# Patient Record
Sex: MALE | Race: WHITE | Employment: FULL TIME | URBAN - METROPOLITAN AREA
[De-identification: names, ages, dates, MRNs, and addresses within clinical notes are randomized per-mention and may not be internally consistent; named-entity substitution may affect disease eponyms.]

---

## 2017-02-21 ENCOUNTER — HOSPITAL ENCOUNTER (OUTPATIENT)
Dept: CT IMAGING | Age: 37
Discharge: HOME | End: 2017-02-21
Payer: SELF-PAY

## 2017-02-21 DIAGNOSIS — Z82.49 FAMILY HISTORY OF HEART DISEASE: ICD-10-CM

## 2017-02-21 DIAGNOSIS — Z86.79 HISTORY OF HIGH BLOOD PRESSURE: ICD-10-CM

## 2017-02-21 DIAGNOSIS — Z91.89 SEDENTARY LIFESTYLE: ICD-10-CM

## 2017-02-21 PROCEDURE — 75571 CT HRT W/O DYE W/CA TEST: CPT

## 2017-02-22 NOTE — CARDIO/PULMONARY
Reached patient at his given mobile number and shared his coronary artery CT score of zero with him. Discussed the meaning of this score. Patient has no further questions at this time.

## 2021-03-04 ENCOUNTER — TELEPHONE (OUTPATIENT)
Dept: FAMILY MEDICINE CLINIC | Age: 41
End: 2021-03-04

## 2021-03-04 NOTE — TELEPHONE ENCOUNTER
Spoke to pt and reviewed old chart. Looks like pt was having BP issues dated back to 12/8/2015. Pt advised of this. He stated that he needed it for his new insurance.

## 2023-01-10 ENCOUNTER — HOSPITAL ENCOUNTER (OUTPATIENT)
Dept: PHYSICAL THERAPY | Age: 43
Discharge: HOME | End: 2023-01-10
Payer: COMMERCIAL

## 2023-01-10 PROCEDURE — 97110 THERAPEUTIC EXERCISES: CPT

## 2023-01-10 PROCEDURE — 97162 PT EVAL MOD COMPLEX 30 MIN: CPT

## 2023-01-10 NOTE — THERAPY EVALUATION
[address] Physical Therapy  [address] [facility] [address] [address] [place]  Phone: [phone] Fax: [phone]    Plan of Care/Statement of Necessity for Physical Therapy Services  2-15    Patient name: Vickey Early  : 1980  Provider#: [id number]  Referral source: Emir Duvall PA-C      Medical/Treatment Diagnosis: Right arm pain [M79.601]  Right shoulder pain [M25.511]     Prior Hospitalization: see medical history     Comorbidities: allergies, asthma, back pain, headaches, high blood pressure  Prior Level of Function: independent  Medications: Verified on Patient Summary List    Start of Care: 01/10/2023      Onset Date: subacute onset       The Plan of Care and following information is based on the information from the initial evaluation. Assessment/ key information: Patient is a 38year old male who presents to physical therapy services due to subacute onset of right lateral shoulder pain. Patient presents today with functional mobility, muscle endurance, muscle power, and movement impairments. Patient demonstrated decreased upper extremity/periscapular strength during MMT assessment, limited pain-free shoulder range of motion, tenderness to palpation along anterior [address]  joint with manual intervention, hypomobile glenohumeral joint/posterior capsule tightness, and pain limiting daily functional tasks. Patient pain responded favorable to manual intervention and strengthening exercises in todays session, with report of diminished symptoms post-treatment. Patient would benefit from continued skilled physical therapy services to address the impairments listed above to allow for full participation in daily functional tasks such as reaching/lifting/OH activities with minimal to no pain.       Right Shoulder ROM:           AROM                                                 PROM              Flexion                         180 deg, p! with overpressure            180 deg              Abduction                    180 deg, p! with overpressure            180 deg                                                 IR                                 Hand to ~T12-L1, p! NT, P! ER                               Hand to T5                                           61 degrees, p! Left shoulder ROM:         AROM                  PROM  Internal rotation:           Hand to T7                 NT  External rotation:          Hand to T7                       82 degrees    Evaluation Complexity History HIGH Complexity :3+ comorbidities / personal factors will impact the outcome/ POC ; Examination MEDIUM Complexity : 3 Standardized tests and measures addressing body structure, function, activity limitation and / or participation in recreation  ;Presentation MEDIUM Complexity : Evolving with changing characteristics  ; Clinical Decision Making MEDIUM Complexity : FOTO score of 26-74  Overall Complexity Rating: MEDIUM    Problem List: pain affecting function, decrease ROM, decrease strength, decrease ADL/ functional abilitiies, decrease activity tolerance, and decrease flexibility/ joint mobility   Treatment Plan may include any combination of the following: Therapeutic exercise, Neuromuscular reeducation, Manual therapy, Therapeutic activity, Self care/home management, Electric stim unattended , Vasopneumatic device, Gait training, Ultrasound, Mechanical traction, Electric stim attended, Needle insertion w/o injection (1 or 2 muscles), and Needle insertion w/o injection (3+ muscles)  Patient / Family readiness to learn indicated by: asking questions, trying to perform skills, and interest  Persons(s) to be included in education: patient (P)  Barriers to Learning/Limitations: None  Patient Goal (s): to not hurt, get rid of pain  Patient Self Reported Health Status: good  Rehabilitation Potential: good    Short Term Goals: To be accomplished in 6-8 treatments:   Patient will demonstrate understanding of and compliance with HEP showing full participation in physical therapy. Patient will demonstrate right shoulder ER and IR A/PROM >/= 70 degrees with minimal to no pain showing improving functional mobility. Patient will report reduced familiar symptoms by >/= 25% allowing for improving participation in daily tasks. Patient will demonstrate understanding/application of postural principles/recommendations to daily activities toward improved symptom management. Long Term Goals: To be accomplished in 16-18 treatments:   Patient will report minimal to no pain during reaching/lifting activities to show improving functional mobility and participation in daily tasks. Patient will improve FOTO score by the [facility] of 5 to >/= 70 showing significant improvement in their self-reported level of function. Patient will report reduced familiar symptoms by >/=80% while performing ADLs compared to initial evaluation allowing full participation in daily tasks with minimal to no pain. Frequency / Duration: Patient to be seen 2 times per week for 18 treatments. Patient/ Caregiver education and instruction: self care, activity modification, and exercises    [x]  Plan of care has been reviewed with ÁNGEL Block, PT 1/10/2023     ________________________________________________________________________    I certify that the above Therapy Services are being furnished while the patient is under my care. I agree with the treatment plan and certify that this therapy is necessary.     Physician's Signature:____________________  Date:____________Time: _________      Sharyle Rm, PA-C

## 2023-01-10 NOTE — PROGRESS NOTES
PT INITIAL EVALUATION NOTE - [facility] 2-15    Patient Name: Yvon Prudent  Date:1/10/2023  : 1980  [x]  Patient  Verified  Payor: Yanci Mix / Plan: ERICNIRAJ LOUISE [facility] [address] / Product Type: PPO /    In time: 8:10 AM  Out time: 9:15 AM  Total Treatment Time (min): 65 minutes  Total Timed Codes (min): 23 minutes  1:1 Treatment Time ( only): 55 minutes   Visit #: 1     Treatment Area: Right arm pain [M79.601]  Right shoulder pain [M25.511]    SUBJECTIVE  Pain Level (0-10 scale): 3/10 lateral aspect of R shoulder  Any medication changes, allergies to medications, adverse drug reactions, diagnosis change, or new procedure performed?: [] No    [x] Yes (see summary sheet for update)  Subjective:  Patient is R hand dominant. Patient reports random onset of right arm pain that began ~2 months ago. Patient reports pain is located along lateral aspect of upper arm, reports minimal to no pain along GH joint. Pain described as burning sensation and sometimes an ache/soreness at rest. Sometimes he is able to perform all ADLs without any pain, but then sometimes experiences severe pain. Patient notes any motion/movement of R arm away from his body is the worst pain. Difficulty reaching/lifting up and/or away from his body. Reports no hx of previous neck or shoulder pain. No numbness or tingling. Patient was hoping pain would go away on its own, but did not. Pt saw PCP ~3-4 weeks ago and was told it may be something with a muscle/tendon. Patient has had no imaging done at this point. Patient reports he has not noticed any change in strength or range of motion. Reports he has been trying to not move it as much since it has began hurting, specifically has been keeping his arm close to his side. No difficulty sleeping. Current functional limitations: reaching, overhead activities, lifting. Patient goals are \"to not hurt, get rid of pain. \"    PLOF: independent   Mechanism of Injury: subacute onset  Previous Treatment/Compliance: n/a  PMHx/Surgical Hx: see chart  Work Hx: IT  - [employer]  Living Situation: with wife and kids  Pt Goals: \"to not hurt, get rid of pain\"  Barriers: none  Motivation: good      OBJECTIVE/EXAMINATION  OBJECTIVE  Posture:  forward flex head posture with dec cervical lordosis , bilateral scapula protraction  Other Observations:  right shoulder depressed > left  Functional  and Pinch:  no change, WNL  Palpation: slight TTP noted along anterior GH joint during manual intervention     Cervical AROM: ALL WFL, no pain      Right Shoulder ROM:           AROM                                                 PROM              Flexion                         180 deg, p! with overpressure            180 deg              Abduction                    180 deg, p! with overpressure            180 deg                                                 IR                                 Hand to ~T12-L1, p! NT, P! ER                               Hand to T5                                           61 degrees, p!      Left shoulder ROM:         AROM                  PROM  Internal rotation:           Hand to T7                 NT  External rotation:          Hand to T7                       82 degrees      Joint Mobility Assessment: Glenohumeral: hypomobile          Flexibility: dec muscle length bilateral UT/Lev scap/SCM/pectoralis major/minor    UPPER QUARTER   MUSCLE STRENGTH  KEY       R  L  0 - No Contraction   Flexion  4+/5  5/5  1 - Trace    Extension nt  nt  2 - Poor    Abduction 4+/5  5/5  3 - Fair     IR  4+/5, P! 4+/5  4 - Good    ER  4+/5, P! 4+/5  5 - Normal        Special Tests: Right Upper extremity:   Spurling's Test: (-)   Painful Arc: (-)   Full Can Test: (-)   ER Lag: (-)   Lift Off test: (+)   Betty: (+)  Héctor Impingement: (+)  Apprehension-Relocation: (+)         Modality rationale: decrease inflammation, decrease pain, and increase tissue extensibility to improve the patients ability to perform functional daily tasks with minimal to no pain. Min Type Additional Details    [] Estim: []Att   []Unatt        []TENS instruct                  []IFC  []Premod   []NMES                     []Other:  []w/US   []w/ice   []w/heat  Position:  Location:    []  Traction: [] Cervical       []Lumbar                       [] Prone          []Supine                       []Intermittent   []Continuous Lbs:  [] before manual  [] after manual  []w/heat    []  Ultrasound: []Continuous   [] Pulsed at:                           []1MHz   []3MHz Location:  W/cm2:    [] Paraffin         Location:   []w/heat   nt [x]  Ice     []  Heat  []  Ice massage Position: seated  Location: R shoulder     []  Laser  []  Other: Position:  Location:      []  Vasopneumatic Device Pressure:       [] lo [] med [] hi   Temperature:      [x] Skin assessment post-treatment:  [x]intact []redness- no adverse reaction    []redness - adverse reaction:     23 min Therapeutic Exercise:  [x] See flow sheet :   Rationale: increase ROM and increase strength to improve the patients ability to perform functional daily tasks with minimal to no pain.            With   [x] TE   [] TA   [] Neuro   [] SC   [] other: Patient Education: [x] Review HEP    [] Progressed/Changed HEP based on:   [] positioning   [] body mechanics   [] transfers   [] heat/ice application    [] other:      Other Objective/Functional Measures: FOTO Functional Measure: 65/100                Pain Level (0-10 scale) post treatment: 1.5 - 2 /10  lateral aspect of R shoulder    ASSESSMENT/Changes in Function:     [x]  See Plan of [address], PT 1/10/2023

## 2023-01-12 ENCOUNTER — HOSPITAL ENCOUNTER (OUTPATIENT)
Dept: PHYSICAL THERAPY | Age: 43
Discharge: HOME | End: 2023-01-12
Payer: COMMERCIAL

## 2023-01-12 PROCEDURE — 97110 THERAPEUTIC EXERCISES: CPT

## 2023-01-12 PROCEDURE — 97016 VASOPNEUMATIC DEVICE THERAPY: CPT

## 2023-01-12 PROCEDURE — 97140 MANUAL THERAPY 1/> REGIONS: CPT

## 2023-01-12 NOTE — PROGRESS NOTES
PT DAILY TREATMENT NOTE - [facility] 2-15    Patient Name: Annette Chávez  Date:2023  : 1980  [x]  Patient  Verified  Payor: Tate Hawk / [facility] [address] / Product Type: PPO /    In time: 9:31 AM  Out time: 10:30 AM  Total Treatment Time (min): 59 minutes  Total Timed Codes (min): 49 minutes  1:1 Treatment Time ( only): 40 minutes  Visit #:  2    Treatment Area: Right arm pain [M79.601]  Right shoulder pain [M25.511]    SUBJECTIVE  Pain Level (0-10 scale): 1-2/10  Any medication changes, allergies to medications, adverse drug reactions, diagnosis change, or new procedure performed?: [x] No    [] Yes (see summary sheet for update)  Subjective functional status/changes:   [] No changes reported    Patient reports compliance with HEP. Reports some increase in muscle soreness following last session, but notes pain returned to baseline quickly. OBJECTIVE    Modality rationale: decrease inflammation, decrease pain, and increase tissue extensibility to improve the patients ability to perform functional daily tasks with minimal to no pain.     Min Type Additional Details       [] Estim: []Att   []Unatt    []TENS instruct                  []IFC  []Premod   []NMES                     []Other:  []w/US   []w/ice   []w/heat  Position:  Location:       []  Traction: [] Cervical       []Lumbar                       [] Prone          []Supine                       []Intermittent   []Continuous Lbs:  [] before manual  [] after manual  []w/heat    []  Ultrasound: []Continuous   [] Pulsed                       at: []1MHz   []3MHz Location:  W/cm2:    [] Paraffin         Location:   []w/heat    []  Ice     []  Heat  []  Ice massage Position:  Location:    []  Laser  []  Other: Position:  Location:   nt   [x]  Vasopneumatic Device Pressure:       [] lo [x] med [] hi   Temperature: 34 deg     [x] Skin assessment post-treatment:  [x]intact []redness- no adverse reaction    []redness - adverse reaction:     39 min Therapeutic Exercise:  [] See flow sheet :   Rationale: increase ROM and increase strength to improve the patients ability to perform functional daily tasks with minimal to no pain. 10 min Manual Therapy: supine: R shoulder PROM with oscillations and manual distraction all directions; AP GH joint mobilizations    Rationale: decrease pain, increase ROM, and increase tissue extensibility to improve the patients ability to perform functional daily tasks with minimal to no pain. With   [x] TE   [] TA   [] Neuro   [] SC   [] other: Patient Education: [x] Review HEP    [] Progressed/Changed HEP based on:   [] positioning   [] body mechanics   [] transfers   [] heat/ice application    [] other:      Other Objective/Functional Measures:     Pain Level (0-10 scale) post treatment: 0.5/10    ASSESSMENT/Changes in Function:   Patient tolerates treatment session well today. Reviewed initial HEP prescribed at initial evaluation with patient, providing verbal and tactile cues throughout for proper technique. Patient exhibits limited right shoulder active/passive external rotation, however responds favorably to manual intervention and joint mobilizations. Patient demonstrates increased passive ER ROM and dec report of pain on test-retest following joint mobs. Progressed shoulder stability/strengthening exercises with sidelying shoulder series. Patient tolerates light resistance performing active shoulder ER and shoulder flexion, however was unable to tolerate resistance performing shoulder abduction due to deltoid and supraspinatus weakness. Patient will continue to benefit from skilled PT services to modify and progress therapeutic interventions, address functional mobility deficits, address ROM deficits, address strength deficits, analyze and cue movement patterns, and analyze and modify body mechanics/ergonomics to attain remaining goals.      [x]  See Plan of Care  []  See progress note/recertification  []  See Discharge Summary         Progress towards goals / Updated goals:  Short Term Goals: To be accomplished in 6-8 treatments:               Patient will demonstrate understanding of and compliance with HEP showing full participation in physical therapy. Patient will demonstrate right shoulder ER and IR A/PROM >/= 70 degrees with minimal to no pain showing improving functional mobility. Patient will report reduced familiar symptoms by >/= 25% allowing for improving participation in daily tasks. Patient will demonstrate understanding/application of postural principles/recommendations to daily activities toward improved symptom management. Long Term Goals: To be accomplished in 16-18 treatments:               Patient will report minimal to no pain during reaching/lifting activities to show improving functional mobility and participation in daily tasks. Patient will improve FOTO score by the [facility] [address] of 5 to >/= 70 showing significant improvement in their self-reported level of function. Patient will report reduced familiar symptoms by >/=80% while performing ADLs compared to initial evaluation allowing full participation in daily tasks with minimal to no pain.     PLAN  [x]  Upgrade activities as tolerated     [x]  Continue plan of care  [x]  Update interventions per flow sheet       []  Discharge due to:_  []  Other:_      [place], PT 1/12/2023

## 2023-01-17 ENCOUNTER — HOSPITAL ENCOUNTER (OUTPATIENT)
Dept: PHYSICAL THERAPY | Age: 43
Discharge: HOME | End: 2023-01-17
Payer: COMMERCIAL

## 2023-01-17 PROCEDURE — 97110 THERAPEUTIC EXERCISES: CPT

## 2023-01-17 PROCEDURE — 97016 VASOPNEUMATIC DEVICE THERAPY: CPT

## 2023-01-17 NOTE — PROGRESS NOTES
PT DAILY TREATMENT NOTE - [facility] 2-15    Patient Name: Mack Mustafa  Date:2023  : 1980  [x]  Patient  Verified  Payor: Arnol Mckeon / Plan: [employer] DANI [address] / Product Type: PPO /    In time: 10:35 AM  Out time: 11:32 AM  Total Treatment Time (min): 57 minutes  Total Timed Codes (min): 47 minutes  1:1 Treatment Time ( only): 32 minutes  Visit #:  3    Treatment Area: Right arm pain [M79.601]  Right shoulder pain [M25.511]    SUBJECTIVE  Pain Level (0-10 scale): 1-210  Any medication changes, allergies to medications, adverse drug reactions, diagnosis change, or new procedure performed?: [x] No    [] Yes (see summary sheet for update)  Subjective functional status/changes:   [] No changes reported    Patient reports he has noted his pain has decrease in frequency and severity of pain level. Reports he has been trying to increase use of UE (reaching and lifting). OBJECTIVE    Modality rationale: decrease inflammation, decrease pain, and increase tissue extensibility to improve the patients ability to perform functional daily tasks with minimal to no pain.     Min Type Additional Details       [] Estim: []Att   []Unatt    []TENS instruct                  []IFC  []Premod   []NMES                     []Other:  []w/US   []w/ice   []w/heat  Position:  Location:       []  Traction: [] Cervical       []Lumbar                       [] Prone          []Supine                       []Intermittent   []Continuous Lbs:  [] before manual  [] after manual  []w/heat    []  Ultrasound: []Continuous   [] Pulsed                       at: []1MHz   []3MHz Location:  W/cm2:    [] Paraffin         Location:   []w/heat    []  Ice     []  Heat  []  Ice massage Position:  Location:    []  Laser  []  Other: Position:  Location:   10   [x]  Vasopneumatic Device Pressure:       [] lo [x] med [] hi   Temperature: 34 deg     [x] Skin assessment post-treatment:  [x]intact []redness- no adverse reaction []redness - adverse reaction:     45 min Therapeutic Exercise:  [] See flow sheet :   Rationale: increase ROM and increase strength to improve the patients ability to perform functional daily tasks with minimal to no pain. N/a min Manual Therapy: supine: R shoulder PROM with oscillations and manual distraction all directions; AP GH joint mobilizations    Rationale: decrease pain, increase ROM, and increase tissue extensibility to improve the patients ability to perform functional daily tasks with minimal to no pain. With   [x] TE   [] TA   [] Neuro   [] SC   [] other: Patient Education: [x] Review HEP    [] Progressed/Changed HEP based on:   [] positioning   [] body mechanics   [] transfers   [] heat/ice application    [] other:      Other Objective/Functional Measures:     Pain Level (0-10 scale) post treatment: 3/10 \"muscle soreness\"    ASSESSMENT/Changes in Function:   Patient tolerates treatment session well today. Patient demonstrates improving periscapular/UE strength, tolerating progression of exercises via increase resistance. Patient had difficulty with proper technique of supine SA punches due to poor scapular muscular control and scapular rhythm. Provided tactile cues to assist with scapular protraction/retraction and noted slight improvement. Patient report of increase in muscle fatigue and muscle soreness post-tx, but notes no increase in familiar symptoms. Patient will continue to benefit from skilled PT services to modify and progress therapeutic interventions, address functional mobility deficits, address ROM deficits, address strength deficits, analyze and cue movement patterns, and analyze and modify body mechanics/ergonomics to attain remaining goals. [x]  See Plan of Care  []  See progress note/recertification  []  See Discharge Summary         Progress towards goals / Updated goals:  Short Term Goals:  To be accomplished in 6-8 treatments:               Patient will demonstrate understanding of and compliance with HEP showing full participation in physical therapy. Patient will demonstrate right shoulder ER and IR A/PROM >/= 70 degrees with minimal to no pain showing improving functional mobility. Patient will report reduced familiar symptoms by >/= 25% allowing for improving participation in daily tasks. Patient will demonstrate understanding/application of postural principles/recommendations to daily activities toward improved symptom management. Long Term Goals: To be accomplished in 16-18 treatments:               Patient will report minimal to no pain during reaching/lifting activities to show improving functional mobility and participation in daily tasks. Patient will improve FOTO score by the [facility] [address] of 5 to >/= 70 showing significant improvement in their self-reported level of function. Patient will report reduced familiar symptoms by >/=80% while performing ADLs compared to initial evaluation allowing full participation in daily tasks with minimal to no pain.     PLAN  [x]  Upgrade activities as tolerated     [x]  Continue plan of care  [x]  Update interventions per flow sheet       []  Discharge due to:_  []  Other:_      Javid, PT 1/17/2023

## 2023-01-19 ENCOUNTER — HOSPITAL ENCOUNTER (OUTPATIENT)
Dept: PHYSICAL THERAPY | Age: 43
Discharge: HOME | End: 2023-01-19
Payer: COMMERCIAL

## 2023-01-19 PROCEDURE — 97110 THERAPEUTIC EXERCISES: CPT

## 2023-01-19 PROCEDURE — 97016 VASOPNEUMATIC DEVICE THERAPY: CPT

## 2023-01-19 PROCEDURE — 97140 MANUAL THERAPY 1/> REGIONS: CPT

## 2023-01-19 NOTE — PROGRESS NOTES
PT DAILY TREATMENT NOTE - [facility] 2-15    Patient Name: Samson Other  Date:2023  : 1980  [x]  Patient  Verified  Payor: Diana Barahona / Plan: [employer] [facility] [address] / Product Type: PPO /    In time: 947  Out time: 804  Total Treatment Time (min): 60 minutes  Total Timed Codes (min): 50 minutes  1:1 Treatment Time ( only): 42 minutes  Visit #:  4    Treatment Area: Right arm pain [M79.601]  Right shoulder pain [M25.511]    SUBJECTIVE  Pain Level (0-10 scale): 1-2/10  Any medication changes, allergies to medications, adverse drug reactions, diagnosis change, or new procedure performed?: [x] No    [] Yes (see summary sheet for update)  Subjective functional status/changes:   [] No changes reported    Patient noted they have been doing pretty well since previous treatment session, noted they had a bit more fatigue and soreness following. Notes they have continued trying to use their right arm for more activities since initial treatment. OBJECTIVE    Modality rationale: decrease inflammation, decrease pain, and increase tissue extensibility to improve the patients ability to perform functional daily tasks with minimal to no pain.     Min Type Additional Details       [] Estim: []Att   []Unatt    []TENS instruct                  []IFC  []Premod   []NMES                     []Other:  []w/US   []w/ice   []w/heat  Position:  Location:       []  Traction: [] Cervical       []Lumbar                       [] Prone          []Supine                       []Intermittent   []Continuous Lbs:  [] before manual  [] after manual  []w/heat    []  Ultrasound: []Continuous   [] Pulsed                       at: []1MHz   []3MHz Location:  W/cm2:    [] Paraffin         Location:   []w/heat    []  Ice     []  Heat  []  Ice massage Position:  Location:    []  Laser  []  Other: Position:  Location:   10   [x]  Vasopneumatic Device Pressure:       [] lo [x] med [] hi   Temperature: 34 deg     [x] Skin assessment post-treatment:  [x]intact []redness- no adverse reaction    []redness - adverse reaction:     40 min Therapeutic Exercise:  [] See flow sheet :   Rationale: increase ROM and increase strength to improve the patients ability to perform functional daily tasks with minimal to no pain. 10 min Manual Therapy: supine: R shoulder PROM with oscillations and manual distraction all directions; AP GH joint mobilizations    Rationale: decrease pain, increase ROM, and increase tissue extensibility to improve the patients ability to perform functional daily tasks with minimal to no pain. With   [x] TE   [] TA   [] Neuro   [] SC   [] other: Patient Education: [x] Review HEP    [] Progressed/Changed HEP based on:   [] positioning   [] body mechanics   [] transfers   [] heat/ice application    [] other:      Other Objective/Functional Measures:     Pain Level (0-10 scale) post treatment: \"frozen, but not worse,\"    ASSESSMENT/Changes in Function:   Patient tolerated treatment session well today, able to perform exercises and progressions without increasing pain symptoms post treatment session. Patient continues to note increased pain with overhead \"Y\" stretch on 1/2 foam roll, and needed multiple verbal cues to reduce ROM to avoid pain. Patient will continue to benefit from skilled PT services to modify and progress therapeutic interventions, address functional mobility deficits, address ROM deficits, address strength deficits, analyze and cue movement patterns, and analyze and modify body mechanics/ergonomics to attain remaining goals. [x]  See Plan of Care  []  See progress note/recertification  []  See Discharge Summary         Progress towards goals / Updated goals:  Short Term Goals: To be accomplished in 6-8 treatments:               Patient will demonstrate understanding of and compliance with HEP showing full participation in physical therapy.   Patient will demonstrate right shoulder ER and IR A/PROM >/= 70 degrees with minimal to no pain showing improving functional mobility. Patient will report reduced familiar symptoms by >/= 25% allowing for improving participation in daily tasks. Patient will demonstrate understanding/application of postural principles/recommendations to daily activities toward improved symptom management. Long Term Goals: To be accomplished in 16-18 treatments:               Patient will report minimal to no pain during reaching/lifting activities to show improving functional mobility and participation in daily tasks. Patient will improve FOTO score by the [facility] [address] of 5 to >/= 70 showing significant improvement in their self-reported level of function. Patient will report reduced familiar symptoms by >/=80% while performing ADLs compared to initial evaluation allowing full participation in daily tasks with minimal to no pain.     PLAN  [x]  Upgrade activities as tolerated     [x]  Continue plan of care  [x]  Update interventions per flow sheet       []  Discharge due to:_  []  Other:_      Dayna uNnes, PTA 1/19/2023

## 2023-01-24 ENCOUNTER — HOSPITAL ENCOUNTER (OUTPATIENT)
Dept: PHYSICAL THERAPY | Age: 43
Discharge: HOME | End: 2023-01-24
Payer: COMMERCIAL

## 2023-01-24 PROCEDURE — 97140 MANUAL THERAPY 1/> REGIONS: CPT

## 2023-01-24 PROCEDURE — 97016 VASOPNEUMATIC DEVICE THERAPY: CPT

## 2023-01-24 PROCEDURE — 97110 THERAPEUTIC EXERCISES: CPT

## 2023-01-24 NOTE — PROGRESS NOTES
PT DAILY TREATMENT NOTE - [facility] 2-15    Patient Name: Dianna Cornelius  Date:2023  : 1980  [x]  Patient  Verified  Payor: Leisa Grewal / Plan: [facility] DANI [address] / Product Type: PPO /    In time:  8:33 AM Out time: 9:40 AM  Total Treatment Time (min): 67 minutes  Total Timed Codes (min):  52 minutes  1:1 Treatment Time ( only): 45 minutes  Visit #:  5    Treatment Area: Right arm pain [M79.601]  Right shoulder pain [M25.511]    SUBJECTIVE  Pain Level (0-10 scale):  1-210  Any medication changes, allergies to medications, adverse drug reactions, diagnosis change, or new procedure performed?: [x] No    [] Yes (see summary sheet for update)  Subjective functional status/changes:   [] No changes reported    Patient reports he experienced onset of muscle soreness following last session, but returned to baseline within the day or so. However, reports on Saturday/ he began to experience some increase in overall pain. Patient experienced his typical pain located on lateral shoulder, but also notes some onset of pain in anterior [facility] joint that is new. Patient reports he performed HEP yesterday with minimal difficulty while doing exercises, but experienced increase in pain the rest of the day. OBJECTIVE    Modality rationale: decrease inflammation, decrease pain, and increase tissue extensibility to improve the patients ability to perform functional daily tasks with minimal to no pain.     Min Type Additional Details       [] Estim: []Att   []Unatt    []TENS instruct                  []IFC  []Premod   []NMES                     []Other:  []w/US   []w/ice   []w/heat  Position:  Location:       []  Traction: [] Cervical       []Lumbar                       [] Prone          []Supine                       []Intermittent   []Continuous Lbs:  [] before manual  [] after manual  []w/heat    []  Ultrasound: []Continuous   [] Pulsed                       at: []1MHz   []3MHz Location:  W/cm2: [] Paraffin         Location:   []w/heat    []  Ice     []  Heat  []  Ice massage Position:  Location:    []  Laser  []  Other: Position:  Location:   15   [x]  Vasopneumatic Device Pressure:       [] lo [x] med [] hi   Temperature: 34 deg     [x] Skin assessment post-treatment:  [x]intact []redness- no adverse reaction    []redness - adverse reaction:      42 min Therapeutic Exercise:  [] See flow sheet :   Rationale: increase ROM and increase strength to improve the patients ability to perform functional daily tasks with minimal to no pain. 10 min Manual Therapy: supine: R shoulder PROM with oscillations and manual distraction all directions; AP GH joint mobilizations    Rationale: decrease pain, increase ROM, and increase tissue extensibility to improve the patients ability to perform functional daily tasks with minimal to no pain. With   [x] TE   [] TA   [] Neuro   [] SC   [] other: Patient Education: [x] Review HEP    [] Progressed/Changed HEP based on:   [] positioning   [] body mechanics   [] transfers   [] heat/ice application    [] other:      Other Objective/Functional Measures:     Pain Level (0-10 scale) post treatment:  1-2 in R shoulder    ASSESSMENT/Changes in Function:   Patient tolerated treatment session well today, able to perform exercises and progressions appropriately. Noted inc in overall familiar symptoms and pain throughout visit > previous visits. Patient notes new onset of anterior [address] joint pain, but chief complaint remains lateral shoulder pain. Modified intensity of overall session, focusing on gentle active range of motion and UE strengthening exercises. Patient notes discomfort at end range of motion shoulder flexion, abd,and ER, but reports improves with inc repetitions. Patient has tendency to keep shoulder adducted and internally rotated with 90 deg elbow flexion close to his side with shoulder muscle guarding.  Discussed importance of trying to keep UE muscles relaxed and moving to avoid muscle tightness/frozen shoulder Continue to progress per patient tolerance. Patient will continue to benefit from skilled PT services to modify and progress therapeutic interventions, address functional mobility deficits, address ROM deficits, address strength deficits, analyze and cue movement patterns, and analyze and modify body mechanics/ergonomics to attain remaining goals. [x]  See Plan of Care  []  See progress note/recertification  []  See Discharge Summary         Progress towards goals / Updated goals:  Short Term Goals: To be accomplished in 6-8 treatments:               Patient will demonstrate understanding of and compliance with HEP showing full participation in physical therapy. Patient will demonstrate right shoulder ER and IR A/PROM >/= 70 degrees with minimal to no pain showing improving functional mobility. Patient will report reduced familiar symptoms by >/= 25% allowing for improving participation in daily tasks. Patient will demonstrate understanding/application of postural principles/recommendations to daily activities toward improved symptom management. Long Term Goals: To be accomplished in 16-18 treatments:               Patient will report minimal to no pain during reaching/lifting activities to show improving functional mobility and participation in daily tasks. Patient will improve FOTO score by the [facility] [address] of 5 to >/= 70 showing significant improvement in their self-reported level of function. Patient will report reduced familiar symptoms by >/=80% while performing ADLs compared to initial evaluation allowing full participation in daily tasks with minimal to no pain.     PLAN  [x]  Upgrade activities as tolerated     [x]  Continue plan of care  [x]  Update interventions per flow sheet       []  Discharge due to:_  []  Other:_      [employer], PT 1/24/2023

## 2023-01-26 ENCOUNTER — HOSPITAL ENCOUNTER (OUTPATIENT)
Dept: PHYSICAL THERAPY | Age: 43
Discharge: HOME | End: 2023-01-26
Payer: COMMERCIAL

## 2023-01-26 PROCEDURE — 97110 THERAPEUTIC EXERCISES: CPT

## 2023-01-26 PROCEDURE — 97140 MANUAL THERAPY 1/> REGIONS: CPT

## 2023-02-21 ENCOUNTER — HOSPITAL ENCOUNTER (OUTPATIENT)
Dept: PHYSICAL THERAPY | Age: 43
Discharge: HOME | End: 2023-02-21
Payer: COMMERCIAL

## 2023-02-21 PROCEDURE — 97140 MANUAL THERAPY 1/> REGIONS: CPT

## 2023-02-21 PROCEDURE — 97016 VASOPNEUMATIC DEVICE THERAPY: CPT

## 2023-02-21 PROCEDURE — 97110 THERAPEUTIC EXERCISES: CPT

## 2023-02-21 NOTE — PROGRESS NOTES
[address] Physical Therapy  [address] [facility] [address] [facility] [address] [place]  Phone: [phone] Fax: [phone]    Progress Note    Name: No Cain   : 1980   MD: Fang Moya PA-C       Treatment Diagnosis: Right arm pain [M79.601]  Right shoulder pain [M25.511]  Start of Care: 1/10/2023    Visits from Start of Care: 7  Missed Visits: 0    Summary of Care: Skilled physical therapy has consisted of therapeutic exercise, manual intervention, and modalities focused on improving overall upper extremity/periscapular strength, postural awareness, pain-free range of motion, and pain modulation. Assessment / Recommendations:   Patient is a 38year old male who has been seen in skilled physical therapy for 7 visits since initial evaluation on 2023 due to right shoulder pain. Patient was making consistent progress towards all goals and pain was responding favorably to conservative treatment. Patient progress within past month has been slowed due to recent break in physical therapy visits. Patient has been seen for 2 visits over past month due to work conflicts. Patient notes pain continues to fluctuate, but has subjective report of 40-50% reduction in familiar symptoms since initial evaluation. Patient exhibits right shoulder active/passive range of motion similar to initial evaluation, but note decrease in left shoulder active abduction due to pain. Patient exhibits 90 degrees of active pain-free left shoulder abduction, but able to tolerate passive range of motion to 180 degrees. Discussed importance of continued compliance with HEP to maximize progress and benefit of skilled physical therapy. Patient pain responds favorably to manual intervention and therapeutic exercises this visit, with decrease in report of familiar symptoms and increase in pain free range of motion post-tx.  Patient will continue to benefit from skilled PT services to modify and progress therapeutic interventions, address functional mobility deficits, address ROM deficits, address strength deficits, analyze and cue movement patterns, and analyze and modify body mechanics/ergonomics to attain remaining goals. Other Objective/Functional Measures: Today vs (EVAL):  Right Shoulder ROM:           AROM                                                                         PROM              Flexion                        180 deg, p!  (180 deg, p! with overpressure)       180 deg   (180 deg)              Abduction                   90 deg, p! (180 deg, p! with overpressure)          180 deg (180 deg)                                               IR                               NT  (Hand to ~T12-L1, p!)                                  NT (NT, P!)              ER                               60 degrees (Hand to T5)                                    60 degrees (61 degrees, p!)     Left shoulder ROM:         AROM                  PROM  Internal rotation:           Hand to T7                 NT  External rotation:          Hand to T7                  82 degrees    Progress towards goals / Updated goals:  Short Term Goals: To be accomplished in 6-8 treatments:  Patient will demonstrate understanding of and compliance with HEP showing full participation in physical therapy. Progressing  Patient will demonstrate right shoulder ER and IR A/PROM >/= 70 degrees with minimal to no pain showing improving functional mobility. Progressing  Patient will report reduced familiar symptoms by >/= 25% allowing for improving participation in daily tasks. Met (subjective report: 40-50%)  Patient will demonstrate understanding/application of postural principles/recommendations to daily activities toward improved symptom management. Progressing     Long Term Goals:  To be accomplished in 16-18 treatments:  Patient will report minimal to no pain during reaching/lifting activities to show improving functional mobility and participation in daily tasks. Progressing  Patient will improve FOTO score by the [facility] [address] of 5 to >/= 70 showing significant improvement in their self-reported level of function. Progressing  Patient will report reduced familiar symptoms by >/=80% while performing ADLs compared to initial evaluation allowing full participation in daily tasks with minimal to no pain.  Progressing    Other: Continue per POC: 2x/week for 18 treatments       Javid, PT 2/21/2023

## 2023-02-21 NOTE — PROGRESS NOTES
PT DAILY TREATMENT NOTE - [facility] 2-15    Patient Name: Naresh Oscar  Date:2023  : 1980  [x]  Patient  Verified  Payor: Kenyatta Cochran / Plan: [employer] [facility]ANI [address] / Product Type: PPO /    In time:  7:30 AM Out time: 8:30 AM  Total Treatment Time (min): 60 minutes  Total Timed Codes (min):  50 minutes  1:1 Treatment Time ( only): 42  minutes  Visit #:  7    Treatment Area: Right arm pain [M79.601]  Right shoulder pain [M25.511]    SUBJECTIVE  Pain Level (0-10 scale):  1/10  Any medication changes, allergies to medications, adverse drug reactions, diagnosis change, or new procedure performed?: [x] No    [] Yes (see summary sheet for update)  Subjective functional status/changes:   [] No changes reported    Patient reports over the past 3 weeks without any physical therapy appointments, pain has been up and down. Patient notes overall it has been slightly better and tolerable, but has had a couple of days with intense pain. Patient notes he noted increase in shoulder pain last  () when reaching to put his arm around his son at [employer] that lasted the rest of the day. Since this onset of pain, he has not performed HEP at all. Reports he was busy with work which is why he was unable to come back in for appointment, but after noting increase in pain he made it a priority again. OBJECTIVE    Modality rationale: decrease inflammation, decrease pain, and increase tissue extensibility to improve the patients ability to perform functional daily tasks with minimal to no pain.     Min Type Additional Details       [] Estim: []Att   []Unatt    []TENS instruct                  []IFC  []Premod   []NMES                     []Other:  []w/US   []w/ice   []w/heat  Position:  Location:       []  Traction: [] Cervical       []Lumbar                       [] Prone          []Supine                       []Intermittent   []Continuous Lbs:  [] before manual  [] after manual  []w/heat    [] Ultrasound: []Continuous   [] Pulsed                       at: []1MHz   []3MHz Location:  W/cm2:    [] Paraffin         Location:   []w/heat    []  Ice     []  Heat  []  Ice massage Position:  Location:    []  Laser  []  Other: Position:  Location:   10   [x]  Vasopneumatic Device Pressure:       [] lo [x] med [] hi   Temperature: 34 deg     [x] Skin assessment post-treatment:  [x]intact []redness- no adverse reaction    []redness - adverse reaction:     35 min Therapeutic Exercise:  [] See flow sheet :   Rationale: increase ROM and increase strength to improve the patients ability to perform functional daily tasks with minimal to no pain. 15 min Manual Therapy: supine: R shoulder PROM with oscillations and manual distraction all directions; AP GH joint mobilizations; sidelying scapula MWM; subscapularis release; pectoralis release    Rationale: decrease pain, increase ROM, and increase tissue extensibility to improve the patients ability to perform functional daily tasks with minimal to no pain. With   [x] TE   [] TA   [] Neuro   [] SC   [] other: Patient Education: [x] Review HEP    [] Progressed/Changed HEP based on:   [] positioning   [] body mechanics   [] transfers   [] heat/ice application    [] other:      Other Objective/Functional Measures:    Today vs (EVAL):  Right Shoulder ROM:           AROM                                                   PROM              Flexion                        180 deg, p!  (180 deg, p! with overpressure)       180 deg   (180 deg)              Abduction                   90 deg, p! (180 deg, p! with overpressure)          180 deg (180 deg)                                               IR                               NT  (Hand to ~T12-L1, p!)                              NT (NT, P!)              ER                               60 degrees (Hand to T5)                                    60 degrees (61 degrees, p!)     Left shoulder ROM:         AROM PROM  Internal rotation:           Hand to T7                 NT  External rotation:          Hand to T7                  82 degrees    Pain Level (0-10 scale) post treatment:  2 in R shoulder    ASSESSMENT/Changes in Function:   Patient tolerated treatment session well today, able to perform exercises and progressions appropriately. Patient is a 38year old male who has been seen in skilled physical therapy for 7 visits since initial evaluation on 2/21/2023 due to right shoulder pain. Patient was making consistent progress towards all goals and pain was responding favorably to conservative treatment. Patient progress within past month has been slowed due to recent break in physical therapy visits. Patient has been seen for 2 visits over past month due to work conflicts. Patient notes pain continues to fluctuate, but has subjective report of 40-50% reduction in familiar symptoms since initial evaluation. Patient exhibits right shoulder active/passive range of motion similar to initial evaluation, but note decrease in left shoulder active abduction due to pain. Patient exhibits 90 degrees of active pain-free left shoulder abduction, but able to tolerate passive range of motion to 180 degrees. Discussed importance of continued compliance with HEP to maximize progress and benefit of skilled physical therapy. Patient pain responds favorably to manual intervention and therapeutic exercises this visit, with decrease in report of familiar symptoms and increase in pain free range of motion post-tx. Patient will continue to benefit from skilled PT services to modify and progress therapeutic interventions, address functional mobility deficits, address ROM deficits, address strength deficits, analyze and cue movement patterns, and analyze and modify body mechanics/ergonomics to attain remaining goals.      [x]  See Plan of Care  [x]  See progress note/recertification  []  See Discharge Summary         Progress towards goals / Updated goals:  Short Term Goals: To be accomplished in 6-8 treatments:  Patient will demonstrate understanding of and compliance with HEP showing full participation in physical therapy. Progressing  Patient will demonstrate right shoulder ER and IR A/PROM >/= 70 degrees with minimal to no pain showing improving functional mobility. Progressing  Patient will report reduced familiar symptoms by >/= 25% allowing for improving participation in daily tasks. Met (subjective report: 40-50%)  Patient will demonstrate understanding/application of postural principles/recommendations to daily activities toward improved symptom management. Progressing     Long Term Goals: To be accomplished in 16-18 treatments:  Patient will report minimal to no pain during reaching/lifting activities to show improving functional mobility and participation in daily tasks. Progressing  Patient will [facility] to >/= 70 showing significant improvement in their self-reported level of function. Progressing  Patient will report reduced familiar symptoms by >/=80% while performing ADLs compared to initial evaluation allowing full participation in daily tasks with minimal to no pain.  Progressing    PLAN  [x]  Upgrade activities as tolerated     [x]  Continue plan of care  [x]  Update interventions per flow sheet       []  Discharge due to:_  []  Other:_      Milana Barry, PT 2/21/2023

## 2023-02-23 ENCOUNTER — HOSPITAL ENCOUNTER (OUTPATIENT)
Dept: PHYSICAL THERAPY | Age: 43
Discharge: HOME | End: 2023-02-23
Payer: COMMERCIAL

## 2023-02-23 PROCEDURE — 97016 VASOPNEUMATIC DEVICE THERAPY: CPT

## 2023-02-23 PROCEDURE — 97140 MANUAL THERAPY 1/> REGIONS: CPT

## 2023-02-23 PROCEDURE — 97110 THERAPEUTIC EXERCISES: CPT

## 2023-02-23 NOTE — PROGRESS NOTES
PT DAILY TREATMENT NOTE - [facility] 2-15    Patient Name: Amanda Mccormack  Date:2023  : 1980  [x]  Patient  Verified  Payor: Sachin Saunders / Plan: [employer] DANI [facility] [address] / Product Type: PPO /    In time:  7:33 AM Out time: 8:32 AM  Total Treatment Time (min): 59 minutes  Total Timed Codes (min): 49 minutes  1:1 Treatment Time ( only): 49 minutes  Visit #:  8    Treatment Area: Right arm pain [M79.601]  Right shoulder pain [M25.511]    SUBJECTIVE  Pain Level (0-10 scale):  2-3/10  Any medication changes, allergies to medications, adverse drug reactions, diagnosis change, or new procedure performed?: [x] No    [] Yes (see summary sheet for update)  Subjective functional status/changes:   [] No changes reported    Patient reports noting increase in shoulder pain and soreness following last session, noting increase in pain at rest. Reports he was still able to perform all exercises in his HEP though. OBJECTIVE    Modality rationale: decrease inflammation, decrease pain, and increase tissue extensibility to improve the patients ability to perform functional daily tasks with minimal to no pain.     Min Type Additional Details       [] Estim: []Att   []Unatt    []TENS instruct                  []IFC  []Premod   []NMES                     []Other:  []w/US   []w/ice   []w/heat  Position:  Location:       []  Traction: [] Cervical       []Lumbar                       [] Prone          []Supine                       []Intermittent   []Continuous Lbs:  [] before manual  [] after manual  []w/heat    []  Ultrasound: []Continuous   [] Pulsed                       at: []1MHz   []3MHz Location:  W/cm2:    [] Paraffin         Location:   []w/heat    []  Ice     []  Heat  []  Ice massage Position:  Location:    []  Laser  []  Other: Position:  Location:   10   [x]  Vasopneumatic Device Pressure:       [] lo [x] med [] hi   Temperature: 34 deg     [x] Skin assessment post-treatment:  [x]intact []redness- no adverse reaction    []redness - adverse reaction:     34 min Therapeutic Exercise:  [] See flow sheet :   Rationale: increase ROM and increase strength to improve the patients ability to perform functional daily tasks with minimal to no pain. 15 min Manual Therapy: supine: R shoulder PROM with oscillations and manual distraction all directions; AP GH joint mobilizations; sidelying scapula MWM; subscapularis release; pectoralis release    Rationale: decrease pain, increase ROM, and increase tissue extensibility to improve the patients ability to perform functional daily tasks with minimal to no pain. With   [x] TE   [] TA   [] Neuro   [] SC   [] other: Patient Education: [x] Review HEP    [] Progressed/Changed HEP based on:   [] positioning   [] body mechanics   [] transfers   [] heat/ice application    [] other:      Other Objective/Functional Measures:     Pain Level (0-10 scale) post treatment:  2 in R shoulder    ASSESSMENT/Changes in Function:   Patient tolerated treatment session well today, able to perform exercises and progressions appropriately. Modified intensity of PRE this visit due to report of increase in pain. Patient demonstrates slight setback in pain free active/passive range of motion and periscapular strength due to recent break in therapy sessions. Will continue to reintroduce therapeutic exercises and modify/progress per patient tolerance for improving symptom-management. Patient pain responds favorably to manual resisted strengthening in sidelying position this visit, with minimal report of pain but notes onset of muscle fatigue. Patient will continue to benefit from skilled PT services to modify and progress therapeutic interventions, address functional mobility deficits, address ROM deficits, address strength deficits, analyze and cue movement patterns, and analyze and modify body mechanics/ergonomics to attain remaining goals.      [x]  See Plan of Care  [x]  See progress note/recertification  []  See Discharge Summary         Progress towards goals / Updated goals:  Short Term Goals: To be accomplished in 6-8 treatments:  Patient will demonstrate understanding of and compliance with HEP showing full participation in physical therapy. Progressing  Patient will demonstrate right shoulder ER and IR A/PROM >/= 70 degrees with minimal to no pain showing improving functional mobility. Progressing  Patient will report reduced familiar symptoms by >/= 25% allowing for improving participation in daily tasks. Met (subjective report: 40-50%)  Patient will demonstrate understanding/application of postural principles/recommendations to daily activities toward improved symptom management. Progressing     Long Term Goals: To be accomplished in 16-18 treatments:  Patient will report minimal to no pain during reaching/lifting activities to show improving functional mobility and participation in daily tasks. Progressing  Patient will improve FOTO score by the [employer] of 5 to >/= 70 showing significant improvement in their self-reported level of function. Progressing  Patient will report reduced familiar symptoms by >/=80% while performing ADLs compared to initial evaluation allowing full participation in daily tasks with minimal to no pain.  Progressing    PLAN  [x]  Upgrade activities as tolerated     [x]  Continue plan of care  [x]  Update interventions per flow sheet       []  Discharge due to:_  []  Other:_      Javid, PT 2/23/2023

## 2023-02-28 ENCOUNTER — HOSPITAL ENCOUNTER (OUTPATIENT)
Dept: PHYSICAL THERAPY | Age: 43
Discharge: HOME | End: 2023-02-28
Payer: COMMERCIAL

## 2023-02-28 PROCEDURE — 97110 THERAPEUTIC EXERCISES: CPT

## 2023-02-28 PROCEDURE — 97016 VASOPNEUMATIC DEVICE THERAPY: CPT

## 2023-02-28 NOTE — PROGRESS NOTES
PT DAILY TREATMENT NOTE - [facility] 2-15    Patient Name: Chandler Tierney  Date:2023  : 1980  [x]  Patient  Verified  Payor: Emma Mills / Plan: ROSELIA LOUISE [facility] [address] / Product Type: PPO /    In time: 2:02 PM Out time: 3:18 PM  Total Treatment Time (min):76 minutes  Total Timed Codes (min): 56 minutes  1:1 Treatment Time ( only): 38 minutes  Visit #:  9    Treatment Area: Right arm pain [M79.601]  Right shoulder pain [M25.511]    SUBJECTIVE  Pain Level (0-10 scale): 0/10  Any medication changes, allergies to medications, adverse drug reactions, diagnosis change, or new procedure performed?: [x] No    [] Yes (see summary sheet for update)  Subjective functional status/changes:   [] No changes reported    Patient reports he typically experiences increase in pain following HEP that lasts several hours. OBJECTIVE    Modality rationale: decrease inflammation, decrease pain, and increase tissue extensibility to improve the patients ability to perform functional daily tasks with minimal to no pain.     Min Type Additional Details       [] Estim: []Att   []Unatt    []TENS instruct                  []IFC  []Premod   []NMES                     []Other:  []w/US   []w/ice   []w/heat  Position:  Location:       []  Traction: [] Cervical       []Lumbar                       [] Prone          []Supine                       []Intermittent   []Continuous Lbs:  [] before manual  [] after manual  []w/heat    []  Ultrasound: []Continuous   [] Pulsed                       at: []1MHz   []3MHz Location:  W/cm2:    [] Paraffin         Location:   []w/heat    []  Ice     []  Heat  []  Ice massage Position:  Location:    []  Laser  []  Other: Position:  Location:   15   [x]  Vasopneumatic Device Pressure:       [] lo [x] med [] hi   Temperature: 34 deg     [x] Skin assessment post-treatment:  [x]intact []redness- no adverse reaction    []redness - adverse reaction:     56 min Therapeutic Exercise:  [] See flow sheet :   Rationale: increase ROM and increase strength to improve the patients ability to perform functional daily tasks with minimal to no pain. N/a min Manual Therapy: supine: R shoulder PROM with oscillations and manual distraction all directions; AP GH joint mobilizations; sidelying scapula MWM; subscapularis release; pectoralis release    Rationale: decrease pain, increase ROM, and increase tissue extensibility to improve the patients ability to perform functional daily tasks with minimal to no pain. With   [x] TE   [] TA   [] Neuro   [] SC   [] other: Patient Education: [x] Review HEP    [] Progressed/Changed HEP based on:   [] positioning   [] body mechanics   [] transfers   [] heat/ice application    [] other:      Other Objective/Functional Measures:     Pain Level (0-10 scale) post treatment:  0 in R shoulder    ASSESSMENT/Changes in Function:   Patient tolerated treatment session well today, able to perform exercises and progressions appropriately. Patient continues to exhibit difficulty with shoulder abduction-focused exercises, due to onset of familiar symptoms at >/= 90 degree abduction. However, note improving tolerance to flexion-based exercises, with minimal onset of pain this session. Patient tolerates increase in overall intensity of periscapular strengthening exercises this visit, will reassess tolerance/response next visit. Patient will continue to benefit from skilled PT services to modify and progress therapeutic interventions, address functional mobility deficits, address ROM deficits, address strength deficits, analyze and cue movement patterns, and analyze and modify body mechanics/ergonomics to attain remaining goals. [x]  See Plan of Care  [x]  See progress note/recertification  []  See Discharge Summary         Progress towards goals / Updated goals:  Short Term Goals:  To be accomplished in 6-8 treatments:  Patient will demonstrate understanding of and compliance with HEP showing full participation in physical therapy. Progressing  Patient will demonstrate right shoulder ER and IR A/PROM >/= 70 degrees with minimal to no pain showing improving functional mobility. Progressing  Patient will report reduced familiar symptoms by >/= 25% allowing for improving participation in daily tasks. Met (subjective report: 40-50%)  Patient will demonstrate understanding/application of postural principles/recommendations to daily activities toward improved symptom management. Progressing     Long Term Goals: To be accomplished in 16-18 treatments:  Patient will report minimal to no pain during reaching/lifting activities to show improving functional mobility and participation in daily tasks. Progressing  Patient will improve FOTO score by the [employer] of 5 to >/= 70 showing significant improvement in their self-reported level of function. Progressing  Patient will report reduced familiar symptoms by >/=80% while performing ADLs compared to initial evaluation allowing full participation in daily tasks with minimal to no pain.  Progressing    PLAN  [x]  Upgrade activities as tolerated     [x]  Continue plan of care  [x]  Update interventions per flow sheet       []  Discharge due to:_  []  Other:_      Javid, PT 2/28/2023

## 2023-03-02 ENCOUNTER — HOSPITAL ENCOUNTER (OUTPATIENT)
Dept: PHYSICAL THERAPY | Age: 43
Discharge: HOME | End: 2023-03-02
Payer: COMMERCIAL

## 2023-03-02 PROCEDURE — 97016 VASOPNEUMATIC DEVICE THERAPY: CPT

## 2023-03-02 PROCEDURE — 97110 THERAPEUTIC EXERCISES: CPT

## 2023-03-02 NOTE — PROGRESS NOTES
PT DAILY TREATMENT NOTE - [facility] 2-15    Patient Name: Carolyn Allison  Date:3/2/2023  : 1980  [x]  Patient  Verified  Payor: Sal Elam / Plan: [employer] DANI [facility] [address] / Product Type: PPO /    In time: 8:05 AM Out time: 9:04  AM  Total Treatment Time (min): 59  minutes  Total Timed Codes (min): 49  minutes  1:1 Treatment Time ( only): 38 minutes  Visit #:  10    Treatment Area: Right arm pain [M79.601]  Right shoulder pain [M25.511]    SUBJECTIVE  Pain Level (0-10 scale): 0 /10  Any medication changes, allergies to medications, adverse drug reactions, diagnosis change, or new procedure performed?: [x] No    [] Yes (see summary sheet for update)  Subjective functional status/changes:   [] No changes reported    Patient reports no major increase in pain/soreness since previous visit. However, notes he ran into a door and his right arm got caught and pulled behind him into extension and IR. Reports this caused immediate onset of sharp pain, lasted for ~1 hour but then returned to baseline level of pain. OBJECTIVE    Modality rationale: decrease inflammation, decrease pain, and increase tissue extensibility to improve the patients ability to perform functional daily tasks with minimal to no pain.     Min Type Additional Details       [] Estim: []Att   []Unatt    []TENS instruct                  []IFC  []Premod   []NMES                     []Other:  []w/US   []w/ice   []w/heat  Position:  Location:       []  Traction: [] Cervical       []Lumbar                       [] Prone          []Supine                       []Intermittent   []Continuous Lbs:  [] before manual  [] after manual  []w/heat    []  Ultrasound: []Continuous   [] Pulsed                       at: []1MHz   []3MHz Location:  W/cm2:    [] Paraffin         Location:   []w/heat    []  Ice     []  Heat  []  Ice massage Position:  Location:    []  Laser  []  Other: Position:  Location:   10   [x]  Vasopneumatic Device Pressure: [] lo [x] med [] hi   Temperature: 34 deg     [x] Skin assessment post-treatment:  [x]intact []redness- no adverse reaction    []redness - adverse reaction:     49 min Therapeutic Exercise:  [] See flow sheet :   Rationale: increase ROM and increase strength to improve the patients ability to perform functional daily tasks with minimal to no pain. N/a min Manual Therapy: supine: R shoulder PROM with oscillations and manual distraction all directions; AP GH joint mobilizations; sidelying scapula MWM; subscapularis release; pectoralis release    Rationale: decrease pain, increase ROM, and increase tissue extensibility to improve the patients ability to perform functional daily tasks with minimal to no pain. With   [x] TE   [] TA   [] Neuro   [] SC   [] other: Patient Education: [x] Review HEP    [] Progressed/Changed HEP based on:   [] positioning   [] body mechanics   [] transfers   [] heat/ice application    [] other:      Other Objective/Functional Measures:     Pain Level (0-10 scale) post treatment: 1 in R shoulder    ASSESSMENT/Changes in Function:   Patient tolerated treatment session well today, able to perform exercises and progressions appropriately. Note continued improvement in ability to perform overhead strengthening exercises, with minimal to no report of pain. Patient tolerates progression of PRE via inc weight/resistance from cable column, noting onset of muscle fatigue but no pain. Continue to progress PRE to allow for full participation in overhead and lifting activities. Patient will continue to benefit from skilled PT services to modify and progress therapeutic interventions, address functional mobility deficits, address ROM deficits, address strength deficits, analyze and cue movement patterns, and analyze and modify body mechanics/ergonomics to attain remaining goals.      [x]  See Plan of Care  [x]  See progress note/recertification  []  See Discharge Summary         Progress towards goals / Updated goals:  Short Term Goals: To be accomplished in 6-8 treatments:  Patient will demonstrate understanding of and compliance with HEP showing full participation in physical therapy. Progressing  Patient will demonstrate right shoulder ER and IR A/PROM >/= 70 degrees with minimal to no pain showing improving functional mobility. Progressing  Patient will report reduced familiar symptoms by >/= 25% allowing for improving participation in daily tasks. Met (subjective report: 40-50%)  Patient will demonstrate understanding/application of postural principles/recommendations to daily activities toward improved symptom management. Progressing     Long Term Goals: To be accomplished in 16-18 treatments:  Patient will report minimal to no pain during reaching/lifting activities to show improving functional mobility and participation in daily tasks. Progressing  Patient will improve FOTO score by the [employer] of 5 to >/= 70 showing significant improvement in their self-reported level of function. Progressing  Patient will report reduced familiar symptoms by >/=80% while performing ADLs compared to initial evaluation allowing full participation in daily tasks with minimal to no pain.  Progressing    PLAN  [x]  Upgrade activities as tolerated     [x]  Continue plan of care  [x]  Update interventions per flow sheet       []  Discharge due to:_  []  Other:_      Javid, PT 3/2/2023

## 2023-03-04 NOTE — PROGRESS NOTES
PT DAILY TREATMENT NOTE - [facility] 2-15    Patient Name: Delilah Lindquist  Date:2023  : 1980  [x]  Patient  Verified  Payor: Chano Brooks / [facility] [facility] [address] / Product Type: PPO /    In time:  7:05 AM Out time: 8:00 AM  Total Treatment Time (min): 55 minutes  Total Timed Codes (min):  55 minutes  1:1 Treatment Time ( only): 55 minutes  Visit #:  6    Treatment Area: Right arm pain [M79.601]  Right shoulder pain [M25.511]    SUBJECTIVE  Pain Level (0-10 scale):  0/10  Any medication changes, allergies to medications, adverse drug reactions, diagnosis change, or new procedure performed?: [x] No    [] Yes (see summary sheet for update)  Subjective functional status/changes:   [] No changes reported    Patient reports experiencing some increase in burning sensation along lateral shoulder yesterday following previous session. However, notes minimal to no pain this morning. Reports he was still able to perform all exercises with minimal to no difficulty yesterday. OBJECTIVE    Modality rationale: decrease inflammation, decrease pain, and increase tissue extensibility to improve the patients ability to perform functional daily tasks with minimal to no pain.     Min Type Additional Details       [] Estim: []Att   []Unatt    []TENS instruct                  []IFC  []Premod   []NMES                     []Other:  []w/US   []w/ice   []w/heat  Position:  Location:       []  Traction: [] Cervical       []Lumbar                       [] Prone          []Supine                       []Intermittent   []Continuous Lbs:  [] before manual  [] after manual  []w/heat    []  Ultrasound: []Continuous   [] Pulsed                       at: []1MHz   []3MHz Location:  W/cm2:    [] Paraffin         Location:   []w/heat    []  Ice     []  Heat  []  Ice massage Position:  Location:    []  Laser  []  Other: Position:  Location:   N/a   [x]  Vasopneumatic Device Pressure:       [] lo [x] med [] hi Temperature: 34 deg     [x] Skin assessment post-treatment:  [x]intact []redness- no adverse reaction    []redness - adverse reaction:     43 min Therapeutic Exercise:  [] See flow sheet :   Rationale: increase ROM and increase strength to improve the patients ability to perform functional daily tasks with minimal to no pain. 12 min Manual Therapy: supine: R shoulder PROM with oscillations and manual distraction all directions; AP GH joint mobilizations    Rationale: decrease pain, increase ROM, and increase tissue extensibility to improve the patients ability to perform functional daily tasks with minimal to no pain. With   [x] TE   [] TA   [] Neuro   [] SC   [] other: Patient Education: [x] Review HEP    [] Progressed/Changed HEP based on:   [] positioning   [] body mechanics   [] transfers   [] heat/ice application    [] other:      Other Objective/Functional Measures:     Pain Level (0-10 scale) post treatment:  1 in R shoulder    ASSESSMENT/Changes in Function:   Patient tolerated treatment session well today, able to perform exercises and progressions appropriately. Patient demonstrates shoulder instability during passive ER ROM with onset of pain at end range. Provided posterior tactile pressure along [address] joint to improve stability and pt notes increase pain-free ROM. Introduced progressive stabilization exercises and patient tolerates fairly well. Provided verbal cues to maintain neutral spine position and UE shoulder alignment during plantigrade planks with shoulder taps. Patient unable to perform unilateral doorway pec stretch on RUE due to inability to tolerate 90-90 position. Modified stretch and patient able to tolerate. Continue to progress shoulder stability and strengthening exercises.  Patient will continue to benefit from skilled PT services to modify and progress therapeutic interventions, address functional mobility deficits, address ROM deficits, address strength deficits, analyze and cue movement patterns, and analyze and modify body mechanics/ergonomics to attain remaining goals. [x]  See Plan of Care  []  See progress note/recertification  []  See Discharge Summary         Progress towards goals / Updated goals:  Short Term Goals: To be accomplished in 6-8 treatments:               Patient will demonstrate understanding of and compliance with HEP showing full participation in physical therapy. Patient will demonstrate right shoulder ER and IR A/PROM >/= 70 degrees with minimal to no pain showing improving functional mobility. Patient will report reduced familiar symptoms by >/= 25% allowing for improving participation in daily tasks. Patient will demonstrate understanding/application of postural principles/recommendations to daily activities toward improved symptom management. Long Term Goals: To be accomplished in 16-18 treatments:               Patient will report minimal to no pain during reaching/lifting activities to show improving functional mobility and participation in daily tasks. Patient will improve FOTO score by the [employer] of 5 to >/= 70 showing significant improvement in their self-reported level of function. Patient will report reduced familiar symptoms by >/=80% while performing ADLs compared to initial evaluation allowing full participation in daily tasks with minimal to no pain.     PLAN  [x]  Upgrade activities as tolerated     [x]  Continue plan of care  [x]  Update interventions per flow sheet       []  Discharge due to:_  []  Other:_      Javid, PT 1/26/2023 English

## 2023-03-07 ENCOUNTER — HOSPITAL ENCOUNTER (OUTPATIENT)
Dept: PHYSICAL THERAPY | Age: 43
Discharge: HOME | End: 2023-03-07
Payer: COMMERCIAL

## 2023-03-07 PROCEDURE — 97016 VASOPNEUMATIC DEVICE THERAPY: CPT

## 2023-03-07 PROCEDURE — 97110 THERAPEUTIC EXERCISES: CPT

## 2023-03-07 PROCEDURE — 97140 MANUAL THERAPY 1/> REGIONS: CPT

## 2023-03-07 NOTE — PROGRESS NOTES
PT DAILY TREATMENT NOTE - [facility] 2-15    Patient Name: Hue Hall  Date:3/7/2023  : 1980  [x]  Patient  Verified  Payor: Meka Neither / Plan: [facility] DANI [facility] [address] / Product Type: PPO /    In time: 2:03 PM Out time: 3:06  PM  Total Treatment Time (min): 63  minutes  Total Timed Codes (min): 48  minutes  1:1 Treatment Time ( only): 40 minutes  Visit #:  11    Treatment Area: Right arm pain [M79.601]  Right shoulder pain [M25.511]    SUBJECTIVE  Pain Level (0-10 scale): 1/10  Any medication changes, allergies to medications, adverse drug reactions, diagnosis change, or new procedure performed?: [x] No    [] Yes (see summary sheet for update)  Subjective functional status/changes:   [] No changes reported    Patient reports pain has continued to fluctuate. Patient reports frustration due to inconsistent onset of pain, noting he was able to wash his car without onset of pain  but then reaches to put a dish in  and experiences sharp shooting pain. OBJECTIVE    Modality rationale: decrease inflammation, decrease pain, and increase tissue extensibility to improve the patients ability to perform functional daily tasks with minimal to no pain.     Min Type Additional Details       [] Estim: []Att   []Unatt    []TENS instruct                  []IFC  []Premod   []NMES                     []Other:  []w/US   []w/ice   []w/heat  Position:  Location:       []  Traction: [] Cervical       []Lumbar                       [] Prone          []Supine                       []Intermittent   []Continuous Lbs:  [] before manual  [] after manual  []w/heat    []  Ultrasound: []Continuous   [] Pulsed                       at: []1MHz   []3MHz Location:  W/cm2:    [] Paraffin         Location:   []w/heat    []  Ice     []  Heat  []  Ice massage Position:  Location:    []  Laser  []  Other: Position:  Location:   15   [x]  Vasopneumatic Device Pressure:       [] lo [x] med [] hi   Temperature: 34 deg     [x] Skin assessment post-treatment:  [x]intact []redness- no adverse reaction    []redness - adverse reaction:     38 min Therapeutic Exercise:  [] See flow sheet :   Rationale: increase ROM and increase strength to improve the patients ability to perform functional daily tasks with minimal to no pain. 10 min Manual Therapy: supine: R shoulder PROM with oscillations and manual distraction all directions; AP GH joint mobilizations; sidelying scapula MWM; subscapularis release; pectoralis release    Rationale: decrease pain, increase ROM, and increase tissue extensibility to improve the patients ability to perform functional daily tasks with minimal to no pain. With   [x] TE   [] TA   [] Neuro   [] SC   [] other: Patient Education: [x] Review HEP    [] Progressed/Changed HEP based on:   [] positioning   [] body mechanics   [] transfers   [] heat/ice application    [] other:      Other Objective/Functional Measures:     Pain Level (0-10 scale) post treatment: 0 in R shoulder    ASSESSMENT/Changes in Function:   Patient tolerated treatment session well today, able to perform exercises and progressions appropriately. Patient tolerates all exercises in skilled physical therapy with no report of pain, but has report of continued onset of sharp pains performing daily functional tasks. Discussed possible benefit of orthopedic specialist referral from PCP for further imaging in future if pain persists. Will continue to progress UE and periscapular strengthening for continued symptom management. Patient will continue to benefit from skilled PT services to modify and progress therapeutic interventions, address functional mobility deficits, address ROM deficits, address strength deficits, analyze and cue movement patterns, and analyze and modify body mechanics/ergonomics to attain remaining goals.      [x]  See Plan of Care  [x]  See progress note/recertification  []  See Discharge Summary Progress towards goals / Updated goals:  Short Term Goals: To be accomplished in 6-8 treatments:  Patient will demonstrate understanding of and compliance with HEP showing full participation in physical therapy. Progressing  Patient will demonstrate right shoulder ER and IR A/PROM >/= 70 degrees with minimal to no pain showing improving functional mobility. Progressing  Patient will report reduced familiar symptoms by >/= 25% allowing for improving participation in daily tasks. Met (subjective report: 40-50%)  Patient will demonstrate understanding/application of postural principles/recommendations to daily activities toward improved symptom management. Progressing     Long Term Goals: To be accomplished in 16-18 treatments:  Patient will report minimal to no pain during reaching/lifting activities to show improving functional mobility and participation in daily tasks. Progressing  Patient will improve FOTO score by the [employer] of 5 to >/= 70 showing significant improvement in their self-reported level of function. Progressing  Patient will report reduced familiar symptoms by >/=80% while performing ADLs compared to initial evaluation allowing full participation in daily tasks with minimal to no pain.  Progressing    PLAN  [x]  Upgrade activities as tolerated     [x]  Continue plan of care  [x]  Update interventions per flow sheet       []  Discharge due to:_  []  Other:_      Javid, PT 3/7/2023

## 2023-03-09 ENCOUNTER — HOSPITAL ENCOUNTER (OUTPATIENT)
Dept: PHYSICAL THERAPY | Age: 43
Discharge: HOME | End: 2023-03-09
Payer: COMMERCIAL

## 2023-03-09 PROCEDURE — 97110 THERAPEUTIC EXERCISES: CPT

## 2023-03-09 PROCEDURE — 97016 VASOPNEUMATIC DEVICE THERAPY: CPT

## 2023-03-09 PROCEDURE — 97140 MANUAL THERAPY 1/> REGIONS: CPT

## 2023-03-09 NOTE — PROGRESS NOTES
PT DAILY TREATMENT NOTE - [facility] 2-15    Patient Name: Jon Cam  Date:3/9/2023  : 1980  [x]  Patient  Verified  Payor: Ml Aldana / Plan: [facility] DANI [facility] [address] / Product Type: PPO /    In time: 7:33 AM Out time:  8:28 AM  Total Treatment Time (min):  55  minutes  Total Timed Codes (min):  40  minutes  1:1 Treatment Time ( only):  38 minutes  Visit #:  12    Treatment Area: Right arm pain [M79.601]  Right shoulder pain [M25.511]    SUBJECTIVE  Pain Level (0-10 scale):  2-3/10  Any medication changes, allergies to medications, adverse drug reactions, diagnosis change, or new procedure performed?: [x] No    [] Yes (see summary sheet for update)  Subjective functional status/changes:   [] No changes reported    Patient reports increase pain along lateral aspect of UE when waking up this morning, but felt okay following last session and yesterday. OBJECTIVE    Modality rationale: decrease inflammation, decrease pain, and increase tissue extensibility to improve the patients ability to perform functional daily tasks with minimal to no pain.     Min Type Additional Details       [] Estim: []Att   []Unatt    []TENS instruct                  []IFC  []Premod   []NMES                     []Other:  []w/US   []w/ice   []w/heat  Position:  Location:       []  Traction: [] Cervical       []Lumbar                       [] Prone          []Supine                       []Intermittent   []Continuous Lbs:  [] before manual  [] after manual  []w/heat    []  Ultrasound: []Continuous   [] Pulsed                       at: []1MHz   []3MHz Location:  W/cm2:    [] Paraffin         Location:   []w/heat    []  Ice     []  Heat  []  Ice massage Position:  Location:    []  Laser  []  Other: Position:  Location:   15   [x]  Vasopneumatic Device Pressure:       [] lo [x] med [] hi   Temperature: 34 deg     [x] Skin assessment post-treatment:  [x]intact []redness- no adverse reaction    []redness - adverse reaction:      30 min Therapeutic Exercise:  [] See flow sheet :   Rationale: increase ROM and increase strength to improve the patients ability to perform functional daily tasks with minimal to no pain. 10 min Manual Therapy: supine: R shoulder PROM with oscillations and manual distraction all directions; AP GH joint mobilizations; sidelying scapula MWM; subscapularis release; pectoralis release    Rationale: decrease pain, increase ROM, and increase tissue extensibility to improve the patients ability to perform functional daily tasks with minimal to no pain. With   [x] TE   [] TA   [] Neuro   [] SC   [] other: Patient Education: [x] Review HEP    [] Progressed/Changed HEP based on:   [] positioning   [] body mechanics   [] transfers   [] heat/ice application    [] other:      Other Objective/Functional Measures:     Pain Level (0-10 scale) post treatment:  0 in R shoulder    ASSESSMENT/Changes in Function:   Patient tolerated treatment session well today, able to perform exercises and progressions appropriately. Patient continues to slowly show improvement in periscapular strength, tolerating progression of strength exercises via increased resistance and weight this visit. However, patient continues to exhibit intermittent sharp increase in pain during abduction-focused exercises. Patient able to rest to allow pain to decrease and then able to complete remaining repetitions. Patient will continue to benefit from skilled PT services to modify and progress therapeutic interventions, address functional mobility deficits, address ROM deficits, address strength deficits, analyze and cue movement patterns, and analyze and modify body mechanics/ergonomics to attain remaining goals. [x]  See Plan of Care  [x]  See progress note/recertification  []  See Discharge Summary         Progress towards goals / Updated goals:  Short Term Goals:  To be accomplished in 6-8 treatments:  Patient will demonstrate understanding of and compliance with HEP showing full participation in physical therapy. Progressing  Patient will demonstrate right shoulder ER and IR A/PROM >/= 70 degrees with minimal to no pain showing improving functional mobility. Progressing  Patient will report reduced familiar symptoms by >/= 25% allowing for improving participation in daily tasks. Met (subjective report: 40-50%)  Patient will demonstrate understanding/application of postural principles/recommendations to daily activities toward improved symptom management. Progressing     Long Term Goals: To be accomplished in 16-18 treatments:  Patient will report minimal to no pain during reaching/lifting activities to show improving functional mobility and participation in daily tasks. Progressing  Patient will improve FOTO score by the [employer] of 5 to >/= 70 showing significant improvement in their self-reported level of function. Progressing  Patient will report reduced familiar symptoms by >/=80% while performing ADLs compared to initial evaluation allowing full participation in daily tasks with minimal to no pain.  Progressing    PLAN  [x]  Upgrade activities as tolerated     [x]  Continue plan of care  [x]  Update interventions per flow sheet       []  Discharge due to:_  []  Other:_      Javid, PT 3/9/2023

## 2023-03-14 ENCOUNTER — HOSPITAL ENCOUNTER (OUTPATIENT)
Dept: PHYSICAL THERAPY | Age: 43
Discharge: HOME | End: 2023-03-14
Payer: COMMERCIAL

## 2023-03-14 PROCEDURE — 97016 VASOPNEUMATIC DEVICE THERAPY: CPT

## 2023-03-14 PROCEDURE — 97140 MANUAL THERAPY 1/> REGIONS: CPT

## 2023-03-14 PROCEDURE — 97110 THERAPEUTIC EXERCISES: CPT

## 2023-03-14 NOTE — PROGRESS NOTES
PT DAILY TREATMENT NOTE - [facility] 2-15    Patient Name: Elmira Neumann  Date:3/14/2023  : 1980  [x]  Patient  Verified  Payor: Is[employer]ra [employer] / Plan: [facility] DANI [facility] [address] / Product Type: PPO /    In time: 7:04 AM Out time: 7:56 AM  Total Treatment Time (min): 52 minutes  Total Timed Codes (min):  40 minutes  1:1 Treatment Time ( only): 38 minutes  Visit #:  13    Treatment Area: Right arm pain [M79.601]  Right shoulder pain [M25.511]    SUBJECTIVE  Pain Level (0-10 scale):  0/10  Any medication changes, allergies to medications, adverse drug reactions, diagnosis change, or new procedure performed?: [x] No    [] Yes (see summary sheet for update)  Subjective functional status/changes:   [] No changes reported    Patient reports his dog ran into him yesterday and pushed his arm back behind him and away from hi body creating increase in pain. Notes some inc in soreness this morning after showering/washing his hair. OBJECTIVE    Modality rationale: decrease inflammation, decrease pain, and increase tissue extensibility to improve the patients ability to perform functional daily tasks with minimal to no pain.     Min Type Additional Details       [] Estim: []Att   []Unatt    []TENS instruct                  []IFC  []Premod   []NMES                     []Other:  []w/US   []w/ice   []w/heat  Position:  Location:       []  Traction: [] Cervical       []Lumbar                       [] Prone          []Supine                       []Intermittent   []Continuous Lbs:  [] before manual  [] after manual  []w/heat    []  Ultrasound: []Continuous   [] Pulsed                       at: []1MHz   []3MHz Location:  W/cm2:    [] Paraffin         Location:   []w/heat    []  Ice     []  Heat  []  Ice massage Position:  Location:    []  Laser  []  Other: Position:  Location:   10   [x]  Vasopneumatic Device Pressure:       [] lo [x] med [] hi   Temperature: 34 deg     [x] Skin assessment post-treatment: [x]intact []redness- no adverse reaction    []redness - adverse reaction:     30 min Therapeutic Exercise:  [] See flow sheet :   Rationale: increase ROM and increase strength to improve the patients ability to perform functional daily tasks with minimal to no pain. 10 min Manual Therapy: sidelying scapula MWM, shoulder passive range of motion all directions to patient tolerance with inferior joint mobilizations    supine: R shoulder PROM with oscillations and manual distraction all directions; AP GH joint mobilizations; sidelying scapula MWM; subscapularis release; pectoralis release    Rationale: decrease pain, increase ROM, and increase tissue extensibility to improve the patients ability to perform functional daily tasks with minimal to no pain. With   [x] TE   [] TA   [] Neuro   [] SC   [] other: Patient Education: [x] Review HEP    [] Progressed/Changed HEP based on:   [] positioning   [] body mechanics   [] transfers   [] heat/ice application    [] other:      Other Objective/Functional Measures:     Pain Level (0-10 scale) post treatment: 0 in R shoulder    ASSESSMENT/Changes in Function:   Patient tolerated treatment session well today, able to perform exercises and progressions appropriately. Patient tolerates increase weight on cable column performing shoulder rows and extensions, noting onset of muscle fatigue without onset of pain for the first time. Patient also tolerates increase resistance performing flexion ad abduction weighted lift offs. However, patient better able to tolerate increase weight performing flexion > abduction. Patient will continue to benefit from skilled PT services to modify and progress therapeutic interventions, address functional mobility deficits, address ROM deficits, address strength deficits, analyze and cue movement patterns, and analyze and modify body mechanics/ergonomics to attain remaining goals.      [x]  See Plan of Care  [x]  See progress note/recertification  []  See Discharge Summary         Progress towards goals / Updated goals:  Short Term Goals: To be accomplished in 6-8 treatments:  Patient will demonstrate understanding of and compliance with HEP showing full participation in physical therapy. Progressing  Patient will demonstrate right shoulder ER and IR A/PROM >/= 70 degrees with minimal to no pain showing improving functional mobility. Progressing  Patient will report reduced familiar symptoms by >/= 25% allowing for improving participation in daily tasks. Met (subjective report: 40-50%)  Patient will demonstrate understanding/application of postural principles/recommendations to daily activities toward improved symptom management. Progressing     Long Term Goals: To be accomplished in 16-18 treatments:  Patient will report minimal to no pain during reaching/lifting activities to show improving functional mobility and participation in daily tasks. Progressing  Patient will improve FOTO score by the [employer] of 5 to >/= 70 showing significant improvement in their self-reported level of function. Progressing  Patient will report reduced familiar symptoms by >/=80% while performing ADLs compared to initial evaluation allowing full participation in daily tasks with minimal to no pain.  Progressing    PLAN  [x]  Upgrade activities as tolerated     [x]  Continue plan of care  [x]  Update interventions per flow sheet       []  Discharge due to:_  []  Other:_      [employer], PT 3/14/2023

## 2023-03-16 ENCOUNTER — HOSPITAL ENCOUNTER (OUTPATIENT)
Dept: PHYSICAL THERAPY | Age: 43
Discharge: HOME | End: 2023-03-16
Payer: COMMERCIAL

## 2023-03-16 PROCEDURE — 97016 VASOPNEUMATIC DEVICE THERAPY: CPT

## 2023-03-16 PROCEDURE — 97110 THERAPEUTIC EXERCISES: CPT

## 2023-03-16 NOTE — PROGRESS NOTES
PT DAILY TREATMENT NOTE - [facility] 2-15    Patient Name: Sandra Courser  Date:3/16/2023  : 1980  [x]  Patient  Verified  Payor: Pily Bermudez / Plan: [facility] DANI [facility] [address] / Product Type: PPO /    In time: 7:04 AM Out time: 8:06 AM  Total Treatment Time (min): 62 minutes  Total Timed Codes (min):  47 minutes  1:1 Treatment Time ( only): 38 minutes  Visit #:  14    Treatment Area: Right arm pain [M79.601]  Right shoulder pain [M25.511]    SUBJECTIVE  Pain Level (0-10 scale): 1.5/10  Any medication changes, allergies to medications, adverse drug reactions, diagnosis change, or new procedure performed?: [x] No    [] Yes (see summary sheet for update)  Subjective functional status/changes:   [] No changes reported    Patient reports continued compliance with HEP at home. Notes no major complications or changes since previous visit earlier this week. OBJECTIVE    Modality rationale: decrease inflammation, decrease pain, and increase tissue extensibility to improve the patients ability to perform functional daily tasks with minimal to no pain.     Min Type Additional Details       [] Estim: []Att   []Unatt    []TENS instruct                  []IFC  []Premod   []NMES                     []Other:  []w/US   []w/ice   []w/heat  Position:  Location:       []  Traction: [] Cervical       []Lumbar                       [] Prone          []Supine                       []Intermittent   []Continuous Lbs:  [] before manual  [] after manual  []w/heat    []  Ultrasound: []Continuous   [] Pulsed                       at: []1MHz   []3MHz Location:  W/cm2:    [] Paraffin         Location:   []w/heat    []  Ice     []  Heat  []  Ice massage Position:  Location:    []  Laser  []  Other: Position:  Location:   15   [x]  Vasopneumatic Device Pressure:       [] lo [x] med [] hi   Temperature: 34 deg     [x] Skin assessment post-treatment:  [x]intact []redness- no adverse reaction    []redness - adverse reaction: 47 min Therapeutic Exercise:  [] See flow sheet :   Rationale: increase ROM and increase strength to improve the patients ability to perform functional daily tasks with minimal to no pain. N/a min Manual Therapy: sidelying scapula MWM, shoulder passive range of motion all directions to patient tolerance with inferior joint mobilizations    supine: R shoulder PROM with oscillations and manual distraction all directions; AP GH joint mobilizations; sidelying scapula MWM; subscapularis release; pectoralis release    Rationale: decrease pain, increase ROM, and increase tissue extensibility to improve the patients ability to perform functional daily tasks with minimal to no pain. With   [x] TE   [] TA   [] Neuro   [] SC   [] other: Patient Education: [x] Review HEP    [] Progressed/Changed HEP based on:   [] positioning   [] body mechanics   [] transfers   [] heat/ice application    [] other:      Other Objective/Functional Measures:     Pain Level (0-10 scale) post treatment: 1 in R shoulder    ASSESSMENT/Changes in Function:   Patient tolerated treatment session well today, able to perform exercises and progressions appropriately. Patient exhibits limited thoracic mobility that may be contributing to current familiar symptoms. Utilized foam roller series and thoracic extension stretch to target limitations. Will continue to progress PRE per patient tolerance. Patient will continue to benefit from skilled PT services to modify and progress therapeutic interventions, address functional mobility deficits, address ROM deficits, address strength deficits, analyze and cue movement patterns, and analyze and modify body mechanics/ergonomics to attain remaining goals. [x]  See Plan of Care  [x]  See progress note/recertification  []  See Discharge Summary         Progress towards goals / Updated goals:  Short Term Goals:  To be accomplished in 6-8 treatments:  Patient will demonstrate understanding of and compliance with HEP showing full participation in physical therapy. Progressing  Patient will demonstrate right shoulder ER and IR A/PROM >/= 70 degrees with minimal to no pain showing improving functional mobility. Progressing  Patient will report reduced familiar symptoms by >/= 25% allowing for improving participation in daily tasks. Met (subjective report: 40-50%)  Patient will demonstrate understanding/application of postural principles/recommendations to daily activities toward improved symptom management. Progressing     Long Term Goals: To be accomplished in 16-18 treatments:  Patient will report minimal to no pain during reaching/lifting activities to show improving functional mobility and participation in daily tasks. Progressing  Patient will improve FOTO score by the [employer] of 5 to >/= 70 showing significant improvement in their self-reported level of function. Progressing  Patient will report reduced familiar symptoms by >/=80% while performing ADLs compared to initial evaluation allowing full participation in daily tasks with minimal to no pain.  Progressing    PLAN  [x]  Upgrade activities as tolerated     [x]  Continue plan of care  [x]  Update interventions per flow sheet       []  Discharge due to:_  []  Other:_      Javid, PT 3/16/2023

## 2023-03-21 ENCOUNTER — HOSPITAL ENCOUNTER (OUTPATIENT)
Dept: PHYSICAL THERAPY | Age: 43
Discharge: HOME | End: 2023-03-21
Payer: COMMERCIAL

## 2023-03-21 PROCEDURE — 97110 THERAPEUTIC EXERCISES: CPT

## 2023-03-21 PROCEDURE — 97016 VASOPNEUMATIC DEVICE THERAPY: CPT

## 2023-03-21 PROCEDURE — 97140 MANUAL THERAPY 1/> REGIONS: CPT

## 2023-03-21 NOTE — PROGRESS NOTES
PT DAILY TREATMENT NOTE - [facility] -15    Patient Name: Kory Easley  Date:3/21/2023  : 1980  [x]  Patient  Verified  Payor: Nael Hay / [facility] [address] / Product Type: PPO /    In time: 7:01 AM Out time: 8:06 AM  Total Treatment Time (min): 65 minutes  Total Timed Codes (min):  50 minutes  1:1 Treatment Time ( only): 44 minutes  Visit #:  15    Treatment Area: Right arm pain [M79.601]  Right shoulder pain [M25.511]    SUBJECTIVE  Pain Level (0-10 scale): 1/10  Any medication changes, allergies to medications, adverse drug reactions, diagnosis change, or new procedure performed?: [x] No    [] Yes (see summary sheet for update)  Subjective functional status/changes:   [] No changes reported    Patient reports he was installing a ceiling fan last night which caused increase in shoulder pain during this activity and afterwards. Reports pain has calmed down with rest over night. Reports continued compliance with HEP and notes decrease pain with wall slides following correction of technique at previous appointment. OBJECTIVE    Modality rationale: decrease inflammation, decrease pain, and increase tissue extensibility to improve the patients ability to perform functional daily tasks with minimal to no pain.     Min Type Additional Details       [] Estim: []Att   []Unatt    []TENS instruct                  []IFC  []Premod   []NMES                     []Other:  []w/US   []w/ice   []w/heat  Position:  Location:       []  Traction: [] Cervical       []Lumbar                       [] Prone          []Supine                       []Intermittent   []Continuous Lbs:  [] before manual  [] after manual  []w/heat    []  Ultrasound: []Continuous   [] Pulsed                       at: []1MHz   []3MHz Location:  W/cm2:    [] Paraffin         Location:   []w/heat    []  Ice     []  Heat  []  Ice massage Position:  Location:    []  Laser  []  Other: Position:  Location:   15   [x] Vasopneumatic Device Pressure:       [] lo [x] med [] hi   Temperature: 34 deg     [x] Skin assessment post-treatment:  [x]intact []redness- no adverse reaction    []redness - adverse reaction:     40 min Therapeutic Exercise:  [] See flow sheet :   Rationale: increase ROM and increase strength to improve the patients ability to perform functional daily tasks with minimal to no pain. 10 min Manual Therapy: sidelying scapula MWM, supine: R shoulder PROM with oscillations and manual distraction all directions; AP GH joint mobilizations; HELD: shoulder passive range of motion all directions to patient tolerance with inferior joint mobilizations; subscapularis release; pectoralis release    Rationale: decrease pain, increase ROM, and increase tissue extensibility to improve the patients ability to perform functional daily tasks with minimal to no pain. With   [x] TE   [] TA   [] Neuro   [] SC   [] other: Patient Education: [x] Review HEP    [] Progressed/Changed HEP based on:   [] positioning   [] body mechanics   [] transfers   [] heat/ice application    [] other:      Other Objective/Functional Measures:    Today vs (previous reassessment):  Right Shoulder ROM:           AROM                                                                         PROM              Flexion                        180 deg (180 deg, p!)             180 deg   (180 deg)              Abduction                   180 deg (90 deg, p!)                 180 deg (180 deg)                                               IR                              Hand to L1   (NT)                                      NT (NT, P!)              ER                              Hand T3 (60 degrees) (Hand to T5)                                    NT (60 degrees)        Pain Level (0-10 scale) post treatment: 0 in R shoulder    ASSESSMENT/Changes in Function:   Patient tolerated treatment session well today, able to perform exercises and progressions appropriately. Patient is a 38year old male who has been seen in skilled physical therapy for 15 visits since initial evaluation on 1/10/2023 due to chronic right shoulder pain. Patient was making consistent progress towards goals at beginning of session, but has had recent set back due to break in therapy appointments. Patient was not seen in clinic for nearly a month from 1/26/23 to 2/21/23. Patient reports compliance with HEP over this period, but had notable decreased range of motion at previous reassessment compared to initial evaluation. Since this, patient has been seen consistently 2x/week. Patient demonstrates improving pain-free active/passive right shoulder range of motion compared to reassessment last month and measures similar to initial evaluation. Patient has subjective report of painful arc between  degrees and at end range of motion. Patient also exhibits tight posterior capsule limiting shoulder rotation ROM, IR > ER. Patient pain/tightness responds favorably to gentle IR stretches and sleeper stretch. Patient demonstrates improving periscapular and UE strength throughout each session, tolerating progressions via increase weight and resistance. However, patient continues to exhibit impaired postural awareness and postural stability. Discussed with patient importance of postural position and correcting forward head posture for continued symptom-management. Also discussed improving work ergonomics in hopes of improving pain control. Patient notes decrease overall pain since initial evaluation, but continues to experience intermittent sharp/shooting pains with overhead/abduction activities. Patient may benefit from orthopedic referral for further evaluation and imaging in future if familiar symptoms continue and conservative treatment is unable to completely resolve symptoms/pain. Patient reports compliance with HEP and has demonstrated proper technique of all exercises in the clinic.    Patient will continue to benefit from skilled PT services to modify and progress therapeutic interventions, address functional mobility deficits, address ROM deficits, address strength deficits, analyze and cue movement patterns, and analyze and modify body mechanics/ergonomics to attain remaining goals. [x]  See Plan of Care  [x]  See progress note/recertification  []  See Discharge Summary         Progress towards goals / Updated goals:  Short Term Goals: To be accomplished in 6-8 treatments:  Patient will demonstrate understanding of and compliance with HEP showing full participation in physical therapy. MET  Patient will demonstrate right shoulder ER and IR A/PROM >/= 70 degrees with minimal to no pain showing improving functional mobility. Progressing  Patient will report reduced familiar symptoms by >/= 25% allowing for improving participation in daily tasks. Met (subjective report: 40-50%)  Patient will demonstrate understanding/application of postural principles/recommendations to daily activities toward improved symptom management. Progressing     Long Term Goals: To be accomplished in 16-18 treatments:  Patient will report minimal to no pain during reaching/lifting activities to show improving functional mobility and participation in daily tasks. Progressing  Patient will improve FOTO score by the [address] of 5 to >/= 70 showing significant improvement in their self-reported level of function. Progressing (68/100)  Patient will report reduced familiar symptoms by >/=80% while performing ADLs compared to initial evaluation allowing full participation in daily tasks with minimal to no pain.  Progressing    PLAN  [x]  Upgrade activities as tolerated     [x]  Continue plan of care  [x]  Update interventions per flow sheet       []  Discharge due to:_  []  Other:_      [employer], PT 3/21/2023

## 2023-03-21 NOTE — PROGRESS NOTES
[address] Physical Therapy  [address] [place]  Phone: [phone] Fax: [phone]    Progress Note    Name: Chandler Tierney   : 1980   MD: Babak Rodríguez PA-C       Treatment Diagnosis: Right arm pain [M79.601]  Right shoulder pain [M25.511]  Start of Care: 1/10/2023    Visits from Start of Care: 15  Missed Visits: 0    Summary of Care:   Skilled physical therapy has consisted of therapeutic exercise, manual intervention, and modalities focused on improving overall upper extremity/periscapular strength, postural awareness, pain-free range of motion, and pain modulation. Assessment / Recommendations:   Patient is a 38year old male who has been seen in skilled physical therapy for 15 visits since initial evaluation on 1/10/2023 due to chronic right shoulder pain. Patient was making consistent progress towards goals at beginning of session, but has had recent set back due to break in therapy appointments. Patient was not seen in clinic for nearly a month from 23 to 23. Patient reports compliance with HEP over this period, but had notable decreased range of motion at previous reassessment compared to initial evaluation. Since this, patient has been seen consistently 2x/week. Patient demonstrates improving pain-free active/passive right shoulder range of motion compared to reassessment last month and measures similar to initial evaluation. Patient has subjective report of painful arc between  degrees and at end range of motion. Patient also exhibits tight posterior capsule limiting shoulder rotation ROM, IR > ER. Patient pain/tightness responds favorably to gentle IR stretches and sleeper stretch. Patient demonstrates improving periscapular and UE strength throughout each session, tolerating progressions via increase weight and resistance.  However, patient continues to exhibit impaired postural awareness and postural stability. Discussed with patient importance of postural position and correcting forward head posture for continued symptom-management. Also discussed improving work ergonomics in hopes of improving pain control. Patient notes decrease overall pain since initial evaluation, but continues to experience intermittent sharp/shooting pains with overhead/abduction activities. Patient may benefit from orthopedic referral for further evaluation and imaging in future if familiar symptoms continue and conservative treatment is unable to completely resolve symptoms/pain. Patient reports compliance with HEP and has demonstrated proper technique of all exercises in the clinic. Patient will continue to benefit from skilled PT services to modify and progress therapeutic interventions, address functional mobility deficits, address ROM deficits, address strength deficits, analyze and cue movement patterns, and analyze and modify body mechanics/ergonomics to attain remaining goals. Progress towards goals / Updated goals:  Short Term Goals: To be accomplished in 6-8 treatments:  Patient will demonstrate understanding of and compliance with HEP showing full participation in physical therapy. MET  Patient will demonstrate right shoulder ER and IR A/PROM >/= 70 degrees with minimal to no pain showing improving functional mobility. Progressing  Patient will report reduced familiar symptoms by >/= 25% allowing for improving participation in daily tasks. Met (subjective report: 40-50%)  Patient will demonstrate understanding/application of postural principles/recommendations to daily activities toward improved symptom management. Progressing     Long Term Goals: To be accomplished in 16-18 treatments:  Patient will report minimal to no pain during reaching/lifting activities to show improving functional mobility and participation in daily tasks.  Progressing  Patient will improve FOTO score by the [address] [address] of 5 to >/= 70 showing significant improvement in their self-reported level of function. Progressing (68/100)  Patient will report reduced familiar symptoms by >/=80% while performing ADLs compared to initial evaluation allowing full participation in daily tasks with minimal to no pain.  Progressing       Other: Continue per POC: 2x/week for 18 treatments       Javid, PT 3/21/2023

## 2023-03-23 ENCOUNTER — HOSPITAL ENCOUNTER (OUTPATIENT)
Dept: PHYSICAL THERAPY | Age: 43
Discharge: HOME | End: 2023-03-23
Payer: COMMERCIAL

## 2023-03-23 PROCEDURE — 97110 THERAPEUTIC EXERCISES: CPT

## 2023-03-23 PROCEDURE — 97016 VASOPNEUMATIC DEVICE THERAPY: CPT

## 2023-03-23 NOTE — PROGRESS NOTES
PT DAILY TREATMENT NOTE - [facility] 2-15    Patient Name: Ginny Ashley  Date:3/23/2023  : 1980  [x]  Patient  Verified  Payor: Enmanuel Garza / [facility] [address] / Product Type: PPO /    In time: 7:01 AM Out time: 8:04 AM  Total Treatment Time (min): 63 minutes  Total Timed Codes (min):  47 minutes  1:1 Treatment Time ( only): 42 minutes  Visit #:  16    Treatment Area: Right arm pain [M79.601]  Right shoulder pain [M25.511]    SUBJECTIVE  Pain Level (0-10 scale): 1/10  Any medication changes, allergies to medications, adverse drug reactions, diagnosis change, or new procedure performed?: [x] No    [] Yes (see summary sheet for update)  Subjective functional status/changes:   [] No changes reported    Patient reports increase soreness and pain following last session. Notes increased episodes of catching and burning sensation yesterday as well. OBJECTIVE    Modality rationale: decrease inflammation, decrease pain, and increase tissue extensibility to improve the patients ability to perform functional daily tasks with minimal to no pain.     Min Type Additional Details       [] Estim: []Att   []Unatt    []TENS instruct                  []IFC  []Premod   []NMES                     []Other:  []w/US   []w/ice   []w/heat  Position:  Location:       []  Traction: [] Cervical       []Lumbar                       [] Prone          []Supine                       []Intermittent   []Continuous Lbs:  [] before manual  [] after manual  []w/heat    []  Ultrasound: []Continuous   [] Pulsed                       at: []1MHz   []3MHz Location:  W/cm2:    [] Paraffin         Location:   []w/heat    []  Ice     []  Heat  []  Ice massage Position:  Location:    []  Laser  []  Other: Position:  Location:   15   [x]  Vasopneumatic Device Pressure:       [] lo [x] med [] hi   Temperature: 34 deg     [x] Skin assessment post-treatment:  [x]intact []redness- no adverse reaction    []redness - adverse reaction:     47 min Therapeutic Exercise:  [] See flow sheet :   Rationale: increase ROM and increase strength to improve the patients ability to perform functional daily tasks with minimal to no pain. n/a min Manual Therapy: sidelying scapula MWM, supine: R shoulder PROM with oscillations and manual distraction all directions; AP GH joint mobilizations; HELD: shoulder passive range of motion all directions to patient tolerance with inferior joint mobilizations; subscapularis release; pectoralis release    Rationale: decrease pain, increase ROM, and increase tissue extensibility to improve the patients ability to perform functional daily tasks with minimal to no pain. With   [x] TE   [] TA   [] Neuro   [] SC   [] other: Patient Education: [x] Review HEP    [] Progressed/Changed HEP based on:   [] positioning   [] body mechanics   [] transfers   [] heat/ice application    [] other:      Other Objective/Functional Measures:        Pain Level (0-10 scale) post treatment: 1 in R shoulder    ASSESSMENT/Changes in Function:   Patient tolerated treatment session well today, able to perform exercises and progressions appropriately. Provide patient with intermittent cues to improve forward head posture throughout all exercises. Patient demonstrates limited shoulder internal rotation during liftoff exercise. Utilized sleeper stretch to target capsular tightness limiting ROM and noted slight improvement. Continue to progress PRE. Patient will continue to benefit from skilled PT services to modify and progress therapeutic interventions, address functional mobility deficits, address ROM deficits, address strength deficits, analyze and cue movement patterns, and analyze and modify body mechanics/ergonomics to attain remaining goals. [x]  See Plan of Care  [x]  See progress note/recertification  []  See Discharge Summary         Progress towards goals / Updated goals:  Short Term Goals:  To be accomplished in 6-8 treatments:  Patient will demonstrate understanding of and compliance with HEP showing full participation in physical therapy. MET  Patient will demonstrate right shoulder ER and IR A/PROM >/= 70 degrees with minimal to no pain showing improving functional mobility. Progressing  Patient will report reduced familiar symptoms by >/= 25% allowing for improving participation in daily tasks. Met (subjective report: 40-50%)  Patient will demonstrate understanding/application of postural principles/recommendations to daily activities toward improved symptom management. Progressing     Long Term Goals: To be accomplished in 16-18 treatments:  Patient will report minimal to no pain during reaching/lifting activities to show improving functional mobility and participation in daily tasks. Progressing  Patient will improve FOTO score by the [employer] [employer] of 5 to >/= 70 showing significant improvement in their self-reported level of function. Progressing (68/100)  Patient will report reduced familiar symptoms by >/=80% while performing ADLs compared to initial evaluation allowing full participation in daily tasks with minimal to no pain.  Progressing    PLAN  [x]  Upgrade activities as tolerated     [x]  Continue plan of care  [x]  Update interventions per flow sheet       []  Discharge due to:_  []  Other:_      Javid, PT 3/23/2023

## 2023-03-28 ENCOUNTER — HOSPITAL ENCOUNTER (OUTPATIENT)
Dept: PHYSICAL THERAPY | Age: 43
Discharge: HOME | End: 2023-03-28
Payer: COMMERCIAL

## 2023-03-28 PROCEDURE — 97110 THERAPEUTIC EXERCISES: CPT

## 2023-03-28 PROCEDURE — 97140 MANUAL THERAPY 1/> REGIONS: CPT

## 2023-03-28 PROCEDURE — 97016 VASOPNEUMATIC DEVICE THERAPY: CPT

## 2023-03-28 NOTE — PROGRESS NOTES
PT DAILY TREATMENT NOTE - [facility] 2-15    Patient Name: Kenneth Pruitt  Date:3/28/2023  : 1980  [x]  Patient  Verified  Payor: Melchorlilian Tselet / Plan: [employer] DANI [facility] [address] / Product Type: PPO /    In time: 7:01 AM Out time: 8:05 AM  Total Treatment Time (min): 64 minutes  Total Timed Codes (min):  49 minutes  1:1 Treatment Time ( only): 38 minutes  Visit #:  17    Treatment Area: Right arm pain [M79.601]  Right shoulder pain [M25.511]    SUBJECTIVE  Pain Level (0-10 scale): 0.5- 1 /10  Any medication changes, allergies to medications, adverse drug reactions, diagnosis change, or new procedure performed?: [x] No    [] Yes (see summary sheet for update)  Subjective functional status/changes:   [] No changes reported    Patient reports constant soreness/familiar pain along right anterolateral shoulder. OBJECTIVE    Modality rationale: decrease inflammation, decrease pain, and increase tissue extensibility to improve the patients ability to perform functional daily tasks with minimal to no pain.     Min Type Additional Details       [] Estim: []Att   []Unatt    []TENS instruct                  []IFC  []Premod   []NMES                     []Other:  []w/US   []w/ice   []w/heat  Position:  Location:       []  Traction: [] Cervical       []Lumbar                       [] Prone          []Supine                       []Intermittent   []Continuous Lbs:  [] before manual  [] after manual  []w/heat    []  Ultrasound: []Continuous   [] Pulsed                       at: []1MHz   []3MHz Location:  W/cm2:    [] Paraffin         Location:   []w/heat    []  Ice     []  Heat  []  Ice massage Position:  Location:    []  Laser  []  Other: Position:  Location:   15   [x]  Vasopneumatic Device Pressure:       [] lo [x] med [] hi   Temperature: 34 deg     [x] Skin assessment post-treatment:  [x]intact []redness- no adverse reaction    []redness - adverse reaction:     39 min Therapeutic Exercise:  [] See flow sheet :   Rationale: increase ROM and increase strength to improve the patients ability to perform functional daily tasks with minimal to no pain. 10 min Manual Therapy: STM/TPR pectoralis major/minor, supraspinatus/infraspinatus, subscapularis release, PA joint mobilizations with passive shoulder ER/IR @ 45 deg abduction    HELD: sidelying scapula MWM, supine: R shoulder PROM with oscillations and manual distraction all directions; AP GH joint mobilizations; shoulder passive range of motion all directions to patient tolerance with inferior joint mobilizations; subscapularis release; pectoralis release    Rationale: decrease pain, increase ROM, and increase tissue extensibility to improve the patients ability to perform functional daily tasks with minimal to no pain. With   [x] TE   [] TA   [] Neuro   [] SC   [] other: Patient Education: [x] Review HEP    [] Progressed/Changed HEP based on:   [] positioning   [] body mechanics   [] transfers   [] heat/ice application    [] other:      Other Objective/Functional Measures:        Pain Level (0-10 scale) post treatment: 2 in R shoulder    ASSESSMENT/Changes in Function:   Patient tolerated treatment session well today, able to perform exercises and progressions appropriately. Noted increase in point tenderness along pectoralis major/minor musculature during manual intervention. Patient exhibits limited shoulder IR > ER this visit, with notable pain performing active IR liftoffs. Utilized strap to perform IR stretch behind back this visit and patient was able to tolerate better compared to previous visits, noting improving ROM following stretch. Patient will continue to benefit from skilled PT services to modify and progress therapeutic interventions, address functional mobility deficits, address ROM deficits, address strength deficits, analyze and cue movement patterns, and analyze and modify body mechanics/ergonomics to attain remaining goals.      [x] See Plan of Care  [x]  See progress note/recertification  []  See Discharge Summary         Progress towards goals / Updated goals:  Short Term Goals: To be accomplished in 6-8 treatments:  Patient will demonstrate understanding of and compliance with HEP showing full participation in physical therapy. MET  Patient will demonstrate right shoulder ER and IR A/PROM >/= 70 degrees with minimal to no pain showing improving functional mobility. Progressing  Patient will report reduced familiar symptoms by >/= 25% allowing for improving participation in daily tasks. Met (subjective report: 40-50%)  Patient will demonstrate understanding/application of postural principles/recommendations to daily activities toward improved symptom management. Progressing     Long Term Goals: To be accomplished in 16-18 treatments:  Patient will report minimal to no pain during reaching/lifting activities to show improving functional mobility and participation in daily tasks. Progressing  Patient will improve FOTO score by the [employer] [employer] of 5 to >/= 70 showing significant improvement in their self-reported level of function. Progressing (68/100)  Patient will report reduced familiar symptoms by >/=80% while performing ADLs compared to initial evaluation allowing full participation in daily tasks with minimal to no pain.  Progressing    PLAN  [x]  Upgrade activities as tolerated     [x]  Continue plan of care  [x]  Update interventions per flow sheet       []  Discharge due to:_  []  Other:_      Rosalind Mcintosh, PT 3/28/2023

## 2023-03-30 ENCOUNTER — HOSPITAL ENCOUNTER (OUTPATIENT)
Dept: PHYSICAL THERAPY | Age: 43
End: 2023-03-30
Payer: COMMERCIAL

## 2023-03-30 PROCEDURE — 97016 VASOPNEUMATIC DEVICE THERAPY: CPT

## 2023-03-30 PROCEDURE — 97110 THERAPEUTIC EXERCISES: CPT

## 2023-03-30 NOTE — PROGRESS NOTES
PT DAILY TREATMENT NOTE - [facility] 215    Patient Name: Rafat Charles  Date:3/30/2023  : 1980  [x]  Patient  Verified  Payor: Cam He / Plan: BSI DANI [facility] [address] / Product Type: PPO /    In time: 7:04 AM Out time: 8:00 AM  Total Treatment Time (min): 56 minutes  Total Timed Codes (min):  46 minutes  1:1 Treatment Time ( only): 40 minutes  Visit #:  18    Treatment Area: Right arm pain [M79.601]  Right shoulder pain [M25.511]    SUBJECTIVE  Pain Level (0-10 scale): 1 /10  Any medication changes, allergies to medications, adverse drug reactions, diagnosis change, or new procedure performed?: [x] No    [] Yes (see summary sheet for update)  Subjective functional status/changes:   [] No changes reported    Patient reports some generalized soreness the rest of the day following previous visit and yesterday as well. Reports occasional catching sensation throughout the day yesterday. OBJECTIVE    Modality rationale: decrease inflammation, decrease pain, and increase tissue extensibility to improve the patients ability to perform functional daily tasks with minimal to no pain.     Min Type Additional Details       [] Estim: []Att   []Unatt    []TENS instruct                  []IFC  []Premod   []NMES                     []Other:  []w/US   []w/ice   []w/heat  Position:  Location:       []  Traction: [] Cervical       []Lumbar                       [] Prone          []Supine                       []Intermittent   []Continuous Lbs:  [] before manual  [] after manual  []w/heat    []  Ultrasound: []Continuous   [] Pulsed                       at: []1MHz   []3MHz Location:  W/cm2:    [] Paraffin         Location:   []w/heat    []  Ice     []  Heat  []  Ice massage Position:  Location:    []  Laser  []  Other: Position:  Location:   10   [x]  Vasopneumatic Device Pressure:       [] lo [x] med [] hi   Temperature: 34 deg     [x] Skin assessment post-treatment:  [x]intact []redness- no adverse reaction    []redness - adverse reaction:     46 min Therapeutic Exercise:  [] See flow sheet :   Rationale: increase ROM and increase strength to improve the patients ability to perform functional daily tasks with minimal to no pain. N/a min Manual Therapy: STM/TPR pectoralis major/minor, supraspinatus/infraspinatus, subscapularis release, PA joint mobilizations with passive shoulder ER/IR @ 45 deg abduction    HELD: sidelying scapula MWM, supine: R shoulder PROM with oscillations and manual distraction all directions; AP GH joint mobilizations; shoulder passive range of motion all directions to patient tolerance with inferior joint mobilizations; subscapularis release; pectoralis release    Rationale: decrease pain, increase ROM, and increase tissue extensibility to improve the patients ability to perform functional daily tasks with minimal to no pain. With   [x] TE   [] TA   [] Neuro   [] SC   [] other: Patient Education: [x] Review HEP    [] Progressed/Changed HEP based on:   [] positioning   [] body mechanics   [] transfers   [] heat/ice application    [] other:      Other Objective/Functional Measures:        Pain Level (0-10 scale) post treatment: 0 in R shoulder    ASSESSMENT/Changes in Function:   Patient tolerated treatment session well today, able to perform exercises and progressions appropriately. Patient has report of increase in pain during and after performing exercises at home. Discussed importance of performing exercises through pain-free range of motion and a gradual increase in overall intensity with the goal of improving pain and decreasing sx. Emphasis placed on this throughout all exercises and repetitions this visit. Patient requires frequent reminder to perform exercises through pain-free range with gradual increase in ROM as pain allows.  Patient will continue to benefit from skilled PT services to modify and progress therapeutic interventions, address functional mobility deficits, address ROM deficits, address strength deficits, analyze and cue movement patterns, and analyze and modify body mechanics/ergonomics to attain remaining goals. [x]  See Plan of Care  [x]  See progress note/recertification  []  See Discharge Summary         Progress towards goals / Updated goals:  Short Term Goals: To be accomplished in 6-8 treatments:  Patient will demonstrate understanding of and compliance with HEP showing full participation in physical therapy. MET  Patient will demonstrate right shoulder ER and IR A/PROM >/= 70 degrees with minimal to no pain showing improving functional mobility. Progressing  Patient will report reduced familiar symptoms by >/= 25% allowing for improving participation in daily tasks. Met (subjective report: 40-50%)  Patient will demonstrate understanding/application of postural principles/recommendations to daily activities toward improved symptom management. Progressing     Long Term Goals: To be accomplished in 16-18 treatments:  Patient will report minimal to no pain during reaching/lifting activities to show improving functional mobility and participation in daily tasks. Progressing  Patient will improve FOTO score by the [employer] of 5 to >/= 70 showing significant improvement in their self-reported level of function. Progressing (68/100)  Patient will report reduced familiar symptoms by >/=80% while performing ADLs compared to initial evaluation allowing full participation in daily tasks with minimal to no pain.  Progressing    PLAN  [x]  Upgrade activities as tolerated     [x]  Continue plan of care  [x]  Update interventions per flow sheet       []  Discharge due to:_  []  Other:_      Javid, PT 3/30/2023

## 2023-04-05 ENCOUNTER — HOSPITAL ENCOUNTER (OUTPATIENT)
Dept: PHYSICAL THERAPY | Age: 43
End: 2023-04-05
Payer: COMMERCIAL

## 2023-04-05 PROCEDURE — 97140 MANUAL THERAPY 1/> REGIONS: CPT

## 2023-04-05 PROCEDURE — 97110 THERAPEUTIC EXERCISES: CPT

## 2023-04-05 PROCEDURE — 97016 VASOPNEUMATIC DEVICE THERAPY: CPT

## 2023-04-05 NOTE — PROGRESS NOTES
PT DAILY TREATMENT NOTE - [facility] 2-15    Patient Name: Misty Parcel  Date:2023  : 1980  [x]  Patient  Verified  Payor: Damaris Walters / Plan: BSHSI DANI [facility] [address] / Product Type: PPO /    In time: 500 Out time: 607  Total Treatment Time (min): 67 minutes  Total Timed Codes (min):  52 minutes  1:1 Treatment Time ( only): 38 minutes  Visit #:  19    Treatment Area: Right arm pain [M79.601]  Right shoulder pain [M25.511]    SUBJECTIVE  Pain Level (0-10 scale): 1 /10  Any medication changes, allergies to medications, adverse drug reactions, diagnosis change, or new procedure performed?: [x] No    [] Yes (see summary sheet for update)  Subjective functional status/changes:   [] No changes reported    Patient noted they tried to tie bungee cords around a trampoline that was blown over and it snapped and they fell on their outstretched R UE and noted increased pain, noted for about 5 minutes they were unable to move their shoulder. Patient also noted they were given a referral to visit an ortho tomorrow regarding their shoulder. OBJECTIVE    Modality rationale: decrease inflammation, decrease pain, and increase tissue extensibility to improve the patients ability to perform functional daily tasks with minimal to no pain.     Min Type Additional Details       [] Estim: []Att   []Unatt    []TENS instruct                  []IFC  []Premod   []NMES                     []Other:  []w/US   []w/ice   []w/heat  Position:  Location:       []  Traction: [] Cervical       []Lumbar                       [] Prone          []Supine                       []Intermittent   []Continuous Lbs:  [] before manual  [] after manual  []w/heat    []  Ultrasound: []Continuous   [] Pulsed                       at: []1MHz   []3MHz Location:  W/cm2:    [] Paraffin         Location:   []w/heat    []  Ice     []  Heat  []  Ice massage Position:  Location:    []  Laser  []  Other: Position:  Location:   15   [x] Vasopneumatic Device Pressure:       [] lo [x] med [] hi   Temperature: 34 deg     [x] Skin assessment post-treatment:  [x]intact []redness- no adverse reaction    []redness - adverse reaction:     46 min Therapeutic Exercise:  [] See flow sheet :   Rationale: increase ROM and increase strength to improve the patients ability to perform functional daily tasks with minimal to no pain. 10 min Manual Therapy: STM/TPR pectoralis major/minor, supraspinatus/infraspinatus, subscapularis release, AP GH joint mobilizations; shoulder passive range of motion all directions to patient tolerance with inferior joint mobilizations; HELD: sidelying scapula MWM, supine:  subscapularis release; pectoralis release    Rationale: decrease pain, increase ROM, and increase tissue extensibility to improve the patients ability to perform functional daily tasks with minimal to no pain. With   [x] TE   [] TA   [] Neuro   [] SC   [] other: Patient Education: [x] Review HEP    [x] Progressed/Changed HEP based on:   [] positioning   [] body mechanics   [] transfers   [] heat/ice application    [] other:      Other Objective/Functional Measures:        Pain Level (0-10 scale) post treatment: 0 in R shoulder    ASSESSMENT/Changes in Function:   Patient tolerated treatment session well today, able to perform exercises and progressions appropriately. Patient was limited during therex today secondary to increased pain following fall at home since previous treatment session, continue to monitor symptoms moving forward and results of upcoming ortho appointment. Plan to reintroduce exercises as patient's symptoms allow. Continue to progress as tolerated.  Patient will continue to benefit from skilled PT services to modify and progress therapeutic interventions, address functional mobility deficits, address ROM deficits, address strength deficits, analyze and cue movement patterns, and analyze and modify body mechanics/ergonomics to attain remaining goals. [x]  See Plan of Care  [x]  See progress note/recertification  []  See Discharge Summary         Progress towards goals / Updated goals:  Short Term Goals: To be accomplished in 6-8 treatments:  Patient will demonstrate understanding of and compliance with HEP showing full participation in physical therapy. MET  Patient will demonstrate right shoulder ER and IR A/PROM >/= 70 degrees with minimal to no pain showing improving functional mobility. Progressing  Patient will report reduced familiar symptoms by >/= 25% allowing for improving participation in daily tasks. Met (subjective report: 40-50%)  Patient will demonstrate understanding/application of postural principles/recommendations to daily activities toward improved symptom management. Progressing     Long Term Goals: To be accomplished in 16-18 treatments:  Patient will report minimal to no pain during reaching/lifting activities to show improving functional mobility and participation in daily tasks. Progressing  Patient will improve FOTO score by the [facility] of 5 to >/= 70 showing significant improvement in their self-reported level of function. Progressing (68/100)  Patient will report reduced familiar symptoms by >/=80% while performing ADLs compared to initial evaluation allowing full participation in daily tasks with minimal to no pain.  Progressing    PLAN  [x]  Upgrade activities as tolerated     [x]  Continue plan of care  [x]  Update interventions per flow sheet       []  Discharge due to:_  []  Other:_      Dank Bentley, PTA 4/5/2023

## 2023-04-07 ENCOUNTER — APPOINTMENT (OUTPATIENT)
Dept: PHYSICAL THERAPY | Age: 43
End: 2023-04-07
Payer: COMMERCIAL

## 2023-04-13 ENCOUNTER — APPOINTMENT (OUTPATIENT)
Dept: PHYSICAL THERAPY | Age: 43
End: 2023-04-13
Payer: COMMERCIAL

## 2023-04-18 ENCOUNTER — APPOINTMENT (OUTPATIENT)
Dept: PHYSICAL THERAPY | Age: 43
End: 2023-04-18
Payer: COMMERCIAL

## 2023-04-20 ENCOUNTER — APPOINTMENT (OUTPATIENT)
Dept: PHYSICAL THERAPY | Age: 43
End: 2023-04-20
Payer: COMMERCIAL

## 2023-04-25 ENCOUNTER — APPOINTMENT (OUTPATIENT)
Dept: PHYSICAL THERAPY | Age: 43
End: 2023-04-25
Payer: COMMERCIAL

## 2023-04-27 ENCOUNTER — APPOINTMENT (OUTPATIENT)
Dept: PHYSICAL THERAPY | Age: 43
End: 2023-04-27
Payer: COMMERCIAL

## 2023-05-17 RX ORDER — ALBUTEROL SULFATE 90 UG/1
2 AEROSOL, METERED RESPIRATORY (INHALATION)
COMMUNITY

## 2023-05-17 RX ORDER — LISINOPRIL 10 MG/1
10 TABLET ORAL DAILY
COMMUNITY

## 2023-05-17 RX ORDER — OXYCODONE HYDROCHLORIDE AND ACETAMINOPHEN 5; 325 MG/1; MG/1
2 TABLET ORAL EVERY 4 HOURS PRN
COMMUNITY
Start: 2014-03-19

## 2023-08-03 ENCOUNTER — HOSPITAL ENCOUNTER (EMERGENCY)
Facility: HOSPITAL | Age: 43
Discharge: HOME | DRG: 065 | End: 2023-08-06
Payer: COMMERCIAL

## 2023-08-03 ENCOUNTER — HOSPITAL ENCOUNTER (INPATIENT)
Facility: HOSPITAL | Age: 43
LOS: 8 days | Discharge: HOME | DRG: 065 | End: 2023-08-11
Attending: STUDENT IN AN ORGANIZED HEALTH CARE EDUCATION/TRAINING PROGRAM | Admitting: INTERNAL MEDICINE
Payer: COMMERCIAL

## 2023-08-03 DIAGNOSIS — I60.9 SAH (SUBARACHNOID HEMORRHAGE) (HCC): ICD-10-CM

## 2023-08-03 DIAGNOSIS — I60.9 SUBARACHNOID HEMORRHAGE (HCC): Primary | ICD-10-CM

## 2023-08-03 LAB
ALBUMIN SERPL-MCNC: 4.5 G/DL (ref 3.5–5)
ALBUMIN/GLOB SERPL: 1.3 (ref 1.1–2.2)
ALP SERPL-CCNC: 52 U/L (ref 45–117)
ALT SERPL-CCNC: 31 U/L (ref 12–78)
ANION GAP SERPL CALC-SCNC: 6 MMOL/L (ref 5–15)
AST SERPL-CCNC: 15 U/L (ref 15–37)
BASOPHILS # BLD: 0 K/UL (ref 0–0.1)
BASOPHILS NFR BLD: 1 % (ref 0–1)
BILIRUB SERPL-MCNC: 0.3 MG/DL (ref 0.2–1)
BUN SERPL-MCNC: 17 MG/DL (ref 6–20)
BUN/CREAT SERPL: 17 (ref 12–20)
CALCIUM SERPL-MCNC: 9.5 MG/DL (ref 8.5–10.1)
CHLORIDE SERPL-SCNC: 106 MMOL/L (ref 97–108)
CO2 SERPL-SCNC: 27 MMOL/L (ref 21–32)
COMMENT:: NORMAL
COMMENT:: NORMAL
CREAT SERPL-MCNC: 1 MG/DL (ref 0.7–1.3)
DIFFERENTIAL METHOD BLD: NORMAL
EOSINOPHIL # BLD: 0.1 K/UL (ref 0–0.4)
EOSINOPHIL NFR BLD: 1 % (ref 0–7)
ERYTHROCYTE [DISTWIDTH] IN BLOOD BY AUTOMATED COUNT: 11.8 % (ref 11.5–14.5)
GLOBULIN SER CALC-MCNC: 3.4 G/DL (ref 2–4)
GLUCOSE SERPL-MCNC: 97 MG/DL (ref 65–100)
HCT VFR BLD AUTO: 46.4 % (ref 36.6–50.3)
HGB BLD-MCNC: 15.8 G/DL (ref 12.1–17)
IMM GRANULOCYTES # BLD AUTO: 0 K/UL (ref 0–0.04)
IMM GRANULOCYTES NFR BLD AUTO: 0 % (ref 0–0.5)
INR PPP: 1 (ref 0.9–1.1)
LYMPHOCYTES # BLD: 1.9 K/UL (ref 0.8–3.5)
LYMPHOCYTES NFR BLD: 22 % (ref 12–49)
MAGNESIUM SERPL-MCNC: 2.3 MG/DL (ref 1.6–2.4)
MCH RBC QN AUTO: 29.3 PG (ref 26–34)
MCHC RBC AUTO-ENTMCNC: 34.1 G/DL (ref 30–36.5)
MCV RBC AUTO: 86.1 FL (ref 80–99)
MONOCYTES # BLD: 0.6 K/UL (ref 0–1)
MONOCYTES NFR BLD: 6 % (ref 5–13)
NEUTS SEG # BLD: 6.1 K/UL (ref 1.8–8)
NEUTS SEG NFR BLD: 70 % (ref 32–75)
NRBC # BLD: 0 K/UL (ref 0–0.01)
NRBC BLD-RTO: 0 PER 100 WBC
PHOSPHATE SERPL-MCNC: 2 MG/DL (ref 2.6–4.7)
PLATELET # BLD AUTO: 259 K/UL (ref 150–400)
PMV BLD AUTO: 9.5 FL (ref 8.9–12.9)
POTASSIUM SERPL-SCNC: 3.6 MMOL/L (ref 3.5–5.1)
PROT SERPL-MCNC: 7.9 G/DL (ref 6.4–8.2)
PROTHROMBIN TIME: 10.9 SEC (ref 9–11.1)
RBC # BLD AUTO: 5.39 M/UL (ref 4.1–5.7)
SODIUM SERPL-SCNC: 139 MMOL/L (ref 136–145)
SPECIMEN HOLD: NORMAL
SPECIMEN HOLD: NORMAL
WBC # BLD AUTO: 8.7 K/UL (ref 4.1–11.1)

## 2023-08-03 PROCEDURE — 96374 THER/PROPH/DIAG INJ IV PUSH: CPT

## 2023-08-03 PROCEDURE — 4A03X5D MEASUREMENT OF ARTERIAL FLOW, INTRACRANIAL, EXTERNAL APPROACH: ICD-10-PCS | Performed by: RADIOLOGY

## 2023-08-03 PROCEDURE — 2580000003 HC RX 258: Performed by: NURSE PRACTITIONER

## 2023-08-03 PROCEDURE — 6360000002 HC RX W HCPCS: Performed by: NURSE PRACTITIONER

## 2023-08-03 PROCEDURE — A4216 STERILE WATER/SALINE, 10 ML: HCPCS | Performed by: NURSE PRACTITIONER

## 2023-08-03 PROCEDURE — 2580000003 HC RX 258

## 2023-08-03 PROCEDURE — 80053 COMPREHEN METABOLIC PANEL: CPT

## 2023-08-03 PROCEDURE — 6360000002 HC RX W HCPCS

## 2023-08-03 PROCEDURE — 6370000000 HC RX 637 (ALT 250 FOR IP): Performed by: NURSE PRACTITIONER

## 2023-08-03 PROCEDURE — 94762 N-INVAS EAR/PLS OXIMTRY CONT: CPT

## 2023-08-03 PROCEDURE — 2500000003 HC RX 250 WO HCPCS

## 2023-08-03 PROCEDURE — 83735 ASSAY OF MAGNESIUM: CPT

## 2023-08-03 PROCEDURE — 93005 ELECTROCARDIOGRAM TRACING: CPT | Performed by: STUDENT IN AN ORGANIZED HEALTH CARE EDUCATION/TRAINING PROGRAM

## 2023-08-03 PROCEDURE — 2500000003 HC RX 250 WO HCPCS: Performed by: NURSE PRACTITIONER

## 2023-08-03 PROCEDURE — 1100000000 HC RM PRIVATE

## 2023-08-03 PROCEDURE — 70498 CT ANGIOGRAPHY NECK: CPT

## 2023-08-03 PROCEDURE — 84100 ASSAY OF PHOSPHORUS: CPT

## 2023-08-03 PROCEDURE — 96375 TX/PRO/DX INJ NEW DRUG ADDON: CPT

## 2023-08-03 PROCEDURE — 6360000004 HC RX CONTRAST MEDICATION: Performed by: RADIOLOGY

## 2023-08-03 PROCEDURE — 96365 THER/PROPH/DIAG IV INF INIT: CPT

## 2023-08-03 PROCEDURE — 99285 EMERGENCY DEPT VISIT HI MDM: CPT

## 2023-08-03 PROCEDURE — 85610 PROTHROMBIN TIME: CPT

## 2023-08-03 PROCEDURE — 85025 COMPLETE CBC W/AUTO DIFF WBC: CPT

## 2023-08-03 PROCEDURE — 36415 COLL VENOUS BLD VENIPUNCTURE: CPT

## 2023-08-03 PROCEDURE — 70450 CT HEAD/BRAIN W/O DYE: CPT

## 2023-08-03 RX ORDER — ONDANSETRON 4 MG/1
4 TABLET, ORALLY DISINTEGRATING ORAL EVERY 8 HOURS PRN
Status: DISCONTINUED | OUTPATIENT
Start: 2023-08-03 | End: 2023-08-11 | Stop reason: HOSPADM

## 2023-08-03 RX ORDER — 0.9 % SODIUM CHLORIDE 0.9 %
1000 INTRAVENOUS SOLUTION INTRAVENOUS ONCE
Status: COMPLETED | OUTPATIENT
Start: 2023-08-03 | End: 2023-08-03

## 2023-08-03 RX ORDER — ONDANSETRON 2 MG/ML
4 INJECTION INTRAMUSCULAR; INTRAVENOUS ONCE
Status: COMPLETED | OUTPATIENT
Start: 2023-08-03 | End: 2023-08-03

## 2023-08-03 RX ORDER — NIMODIPINE 30 MG/1
60 CAPSULE, LIQUID FILLED ORAL
Status: DISCONTINUED | OUTPATIENT
Start: 2023-08-03 | End: 2023-08-11 | Stop reason: HOSPADM

## 2023-08-03 RX ORDER — DEXTROSE MONOHYDRATE 100 MG/ML
INJECTION, SOLUTION INTRAVENOUS CONTINUOUS PRN
Status: DISCONTINUED | OUTPATIENT
Start: 2023-08-03 | End: 2023-08-11 | Stop reason: HOSPADM

## 2023-08-03 RX ORDER — ACETAMINOPHEN 325 MG/1
650 TABLET ORAL
Status: DISCONTINUED | OUTPATIENT
Start: 2023-08-03 | End: 2023-08-03

## 2023-08-03 RX ORDER — HYDROCODONE BITARTRATE AND ACETAMINOPHEN 5; 325 MG/1; MG/1
1 TABLET ORAL
Status: DISCONTINUED | OUTPATIENT
Start: 2023-08-03 | End: 2023-08-03

## 2023-08-03 RX ORDER — POTASSIUM CHLORIDE 750 MG/1
20 TABLET, FILM COATED, EXTENDED RELEASE ORAL ONCE
Status: COMPLETED | OUTPATIENT
Start: 2023-08-03 | End: 2023-08-03

## 2023-08-03 RX ORDER — ONDANSETRON 2 MG/ML
4 INJECTION INTRAMUSCULAR; INTRAVENOUS EVERY 6 HOURS PRN
Status: DISCONTINUED | OUTPATIENT
Start: 2023-08-03 | End: 2023-08-11 | Stop reason: HOSPADM

## 2023-08-03 RX ORDER — DIPHENHYDRAMINE HYDROCHLORIDE 50 MG/ML
50 INJECTION INTRAMUSCULAR; INTRAVENOUS ONCE
Status: COMPLETED | OUTPATIENT
Start: 2023-08-03 | End: 2023-08-03

## 2023-08-03 RX ORDER — ACETAMINOPHEN 325 MG/1
650 TABLET ORAL EVERY 4 HOURS PRN
Status: DISCONTINUED | OUTPATIENT
Start: 2023-08-03 | End: 2023-08-11 | Stop reason: HOSPADM

## 2023-08-03 RX ORDER — SODIUM CHLORIDE 0.9 % (FLUSH) 0.9 %
5-40 SYRINGE (ML) INJECTION PRN
Status: DISCONTINUED | OUTPATIENT
Start: 2023-08-03 | End: 2023-08-11 | Stop reason: HOSPADM

## 2023-08-03 RX ORDER — SODIUM CHLORIDE 9 MG/ML
INJECTION, SOLUTION INTRAVENOUS CONTINUOUS
Status: DISCONTINUED | OUTPATIENT
Start: 2023-08-03 | End: 2023-08-05

## 2023-08-03 RX ORDER — PROCHLORPERAZINE EDISYLATE 5 MG/ML
10 INJECTION INTRAMUSCULAR; INTRAVENOUS ONCE
Status: COMPLETED | OUTPATIENT
Start: 2023-08-03 | End: 2023-08-03

## 2023-08-03 RX ORDER — SODIUM CHLORIDE 9 MG/ML
INJECTION, SOLUTION INTRAVENOUS PRN
Status: DISCONTINUED | OUTPATIENT
Start: 2023-08-03 | End: 2023-08-11 | Stop reason: HOSPADM

## 2023-08-03 RX ORDER — SODIUM CHLORIDE 0.9 % (FLUSH) 0.9 %
5-40 SYRINGE (ML) INJECTION EVERY 12 HOURS SCHEDULED
Status: DISCONTINUED | OUTPATIENT
Start: 2023-08-03 | End: 2023-08-11

## 2023-08-03 RX ORDER — MORPHINE SULFATE 2 MG/ML
4 INJECTION, SOLUTION INTRAMUSCULAR; INTRAVENOUS
Status: COMPLETED | OUTPATIENT
Start: 2023-08-03 | End: 2023-08-03

## 2023-08-03 RX ORDER — ONDANSETRON 4 MG/1
4 TABLET, ORALLY DISINTEGRATING ORAL ONCE
Status: DISCONTINUED | OUTPATIENT
Start: 2023-08-03 | End: 2023-08-03

## 2023-08-03 RX ORDER — MAGNESIUM SULFATE 1 G/100ML
1000 INJECTION INTRAVENOUS PRN
Status: DISCONTINUED | OUTPATIENT
Start: 2023-08-03 | End: 2023-08-11 | Stop reason: HOSPADM

## 2023-08-03 RX ORDER — LEVETIRACETAM 500 MG/5ML
500 INJECTION, SOLUTION, CONCENTRATE INTRAVENOUS EVERY 12 HOURS
Status: DISCONTINUED | OUTPATIENT
Start: 2023-08-03 | End: 2023-08-04

## 2023-08-03 RX ORDER — ONDANSETRON 2 MG/ML
4 INJECTION INTRAMUSCULAR; INTRAVENOUS ONCE
Status: DISCONTINUED | OUTPATIENT
Start: 2023-08-03 | End: 2023-08-03

## 2023-08-03 RX ADMIN — POTASSIUM CHLORIDE 20 MEQ: 750 TABLET, FILM COATED, EXTENDED RELEASE ORAL at 21:24

## 2023-08-03 RX ADMIN — NICARDIPINE HYDROCHLORIDE 5 MG/HR: 25 INJECTION, SOLUTION INTRAVENOUS at 20:20

## 2023-08-03 RX ADMIN — MORPHINE SULFATE 4 MG: 2 INJECTION, SOLUTION INTRAMUSCULAR; INTRAVENOUS at 18:47

## 2023-08-03 RX ADMIN — SODIUM CHLORIDE: 9 INJECTION, SOLUTION INTRAVENOUS at 20:42

## 2023-08-03 RX ADMIN — DIPHENHYDRAMINE HYDROCHLORIDE 50 MG: 50 INJECTION INTRAMUSCULAR; INTRAVENOUS at 18:45

## 2023-08-03 RX ADMIN — SODIUM CHLORIDE 1000 ML: 9 INJECTION, SOLUTION INTRAVENOUS at 18:44

## 2023-08-03 RX ADMIN — ACETAMINOPHEN 650 MG: 325 TABLET ORAL at 23:45

## 2023-08-03 RX ADMIN — NIMODIPINE 60 MG: 30 CAPSULE, LIQUID FILLED ORAL at 20:58

## 2023-08-03 RX ADMIN — POTASSIUM & SODIUM PHOSPHATES POWDER PACK 280-160-250 MG 500 MG: 280-160-250 PACK at 23:38

## 2023-08-03 RX ADMIN — FAMOTIDINE 20 MG: 10 INJECTION, SOLUTION INTRAVENOUS at 23:30

## 2023-08-03 RX ADMIN — LEVETIRACETAM 500 MG: 100 INJECTION, SOLUTION INTRAVENOUS at 20:42

## 2023-08-03 RX ADMIN — PROCHLORPERAZINE EDISYLATE 10 MG: 5 INJECTION INTRAMUSCULAR; INTRAVENOUS at 18:45

## 2023-08-03 RX ADMIN — SODIUM CHLORIDE, PRESERVATIVE FREE 10 ML: 5 INJECTION INTRAVENOUS at 21:27

## 2023-08-03 RX ADMIN — IOPAMIDOL 100 ML: 755 INJECTION, SOLUTION INTRAVENOUS at 19:26

## 2023-08-03 RX ADMIN — ONDANSETRON 4 MG: 2 INJECTION INTRAMUSCULAR; INTRAVENOUS at 19:28

## 2023-08-03 ASSESSMENT — PAIN DESCRIPTION - LOCATION
LOCATION: HEAD

## 2023-08-03 ASSESSMENT — PAIN DESCRIPTION - ORIENTATION: ORIENTATION: LEFT;RIGHT

## 2023-08-03 ASSESSMENT — ENCOUNTER SYMPTOMS
VOMITING: 1
DIARRHEA: 0
ABDOMINAL PAIN: 0
SHORTNESS OF BREATH: 0
CONSTIPATION: 0
NAUSEA: 1

## 2023-08-03 ASSESSMENT — PAIN SCALES - GENERAL
PAINLEVEL_OUTOF10: 3
PAINLEVEL_OUTOF10: 9
PAINLEVEL_OUTOF10: 2
PAINLEVEL_OUTOF10: 5

## 2023-08-03 ASSESSMENT — PAIN - FUNCTIONAL ASSESSMENT: PAIN_FUNCTIONAL_ASSESSMENT: 0-10

## 2023-08-03 ASSESSMENT — PAIN DESCRIPTION - PAIN TYPE: TYPE: ACUTE PAIN

## 2023-08-03 ASSESSMENT — PAIN DESCRIPTION - DESCRIPTORS: DESCRIPTORS: SHARP

## 2023-08-03 NOTE — ED PROVIDER NOTES
I personally encountered the patient. I personally directed the diagnostic, therapeutic and consultative decisions. I reviewed the advanced care practitioner documented history, exam and MDM. I performed a substantive portion of the medical decision making. The patient presented with   Chief Complaint   Patient presents with    Headache       69-year-old male presenting for evaluation of sudden onset \"worst headache of my life\" approximately 4 hours prior to arrival.  Maximal at onset. No prior history of migraine headaches of similar intensity/character. Associated with nausea vomiting. Patient without focal neurologic deficits on my exam, however, generally ill in appearance, extremely uncomfortable and actively vomiting. Headache is intractable despite medicating with narcotics, migraine cocktail. CT head and CTA head reviewed-concerning for subarachnoid hemorrhage. No known prior history of intracranial aneurysm. Patient is hypertensive in the ER. We will start Cardene drip to keep SBP less than 160. Will c/s neuro IR, NSGY, and admit to ICU. Patient and wife updated on plan by myself. ICD-10-CM    1. Subarachnoid hemorrhage (HCC)  I60.9          CRITICAL CARE NOTE :      IMPENDING DETERIORATION -CNS  ASSOCIATED RISK FACTORS - Bleeding, Vascular Compromise, and CNS Decompensation  MANAGEMENT- Bedside Assessment, supervision of care  INTERPRETATION -  CT Scan, Blood Pressure, and Cardiac Output Measures   INTERVENTIONS - hemodynamic mngmt and Neurologic interventions   CASE REVIEW - Hospitalist/Intensivist, Medical Sub-Specialist, ED PERRI, Nursing, and Family  TREATMENT RESPONSE -Stable  PERFORMED BY - Self    NOTES   :  I have spent 35 minutes of critical care time involved in lab review, consultations with specialist, family decision- making, bedside attention and documentation. This time excludes time spent in any separate billed procedures.   During this entire length of time I was

## 2023-08-03 NOTE — ED PROVIDER NOTES
[facility] EMERGENCY DEP  EMERGENCY DEPARTMENT ENCOUNTER      Pt Name: Keara Little  MRN: [id number]  [address] [facility] 1980  Date of evaluation: 8/3/2023  Provider: Zenia Dempsey PA-C    CHIEF COMPLAINT       Chief Complaint   Patient presents with    Headache         HISTORY OF PRESENT ILLNESS   (Location/Symptom, Timing/Onset, Context/Setting, Quality, Duration, Modifying Factors, Severity)  Note limiting factors. HPI    Keara Little is a 37 y.o. male with Hx of HTN who presents ambulatory to [facility] ED with cc of headache. Patient reports sudden onset severe headache around 4:00 PM while on a work meeting call. Reports stiffness in shoulders/neck, nausea, vomiting. Denies fever, chills, visual changes, gait abnormality, numbness, tingling, any other concerns at this time. PMH: HTN    PCP: Julia Lovell MD    There are no other complaints, changes or physical findings at this time. Review of External Medical Records:     Nursing Notes were reviewed. REVIEW OF SYSTEMS    (2-9 systems for level 4, 10 or more for level 5)     Review of Systems   Constitutional:  Negative for chills and fever. HENT:  Negative for congestion. Respiratory:  Negative for shortness of breath. Cardiovascular:  Negative for chest pain. Gastrointestinal:  Positive for nausea and vomiting. Negative for abdominal pain, constipation and diarrhea. Genitourinary:  Negative for dysuria and hematuria. Musculoskeletal:  Positive for neck pain and neck stiffness. Skin:  Negative for rash. Neurological:  Positive for headaches. Negative for dizziness and light-headedness. All other systems reviewed and are negative. Except as noted above the remainder of the review of systems was reviewed and negative.        PAST MEDICAL HISTORY     Past Medical History:   Diagnosis Date    Asthma     Hypertension          SURGICAL HISTORY       Past Surgical History:   Procedure Laterality Date

## 2023-08-03 NOTE — ED TRIAGE NOTES
Triage: Pt arrives ambulatory from home with CC of worst headache of his life. Symptoms started about an hour ago. Reports stiffness in his shoulders and neck. Denies blurred vision, gait abnormality, numbness, or tingling.

## 2023-08-04 ENCOUNTER — APPOINTMENT (OUTPATIENT)
Facility: HOSPITAL | Age: 43
DRG: 065 | End: 2023-08-04
Attending: RADIOLOGY
Payer: COMMERCIAL

## 2023-08-04 ENCOUNTER — ANESTHESIA (OUTPATIENT)
Facility: HOSPITAL | Age: 43
DRG: 065 | End: 2023-08-04
Payer: COMMERCIAL

## 2023-08-04 ENCOUNTER — APPOINTMENT (OUTPATIENT)
Facility: HOSPITAL | Age: 43
DRG: 065 | End: 2023-08-04
Payer: COMMERCIAL

## 2023-08-04 ENCOUNTER — ANESTHESIA EVENT (OUTPATIENT)
Facility: HOSPITAL | Age: 43
DRG: 065 | End: 2023-08-04
Payer: COMMERCIAL

## 2023-08-04 PROBLEM — I60.9 SUBARACHNOID HEMORRHAGE (HCC): Status: ACTIVE | Noted: 2023-08-04

## 2023-08-04 LAB
ANION GAP SERPL CALC-SCNC: 7 MMOL/L (ref 5–15)
BUN SERPL-MCNC: 9 MG/DL (ref 6–20)
BUN/CREAT SERPL: 12 (ref 12–20)
CALCIUM SERPL-MCNC: 8.5 MG/DL (ref 8.5–10.1)
CHLORIDE SERPL-SCNC: 109 MMOL/L (ref 97–108)
CHOLEST SERPL-MCNC: 182 MG/DL
CO2 SERPL-SCNC: 24 MMOL/L (ref 21–32)
CREAT SERPL-MCNC: 0.77 MG/DL (ref 0.7–1.3)
ECHO AO ROOT DIAM: 3.5 CM
ECHO AO ROOT INDEX: 1.89 CM/M2
ECHO AV AREA PEAK VELOCITY: 2.6 CM2
ECHO AV AREA/BSA PEAK VELOCITY: 1.4 CM2/M2
ECHO AV PEAK GRADIENT: 4 MMHG
ECHO AV PEAK VELOCITY: 1.1 M/S
ECHO AV VELOCITY RATIO: 0.91
ECHO BSA: 1.88 M2
ECHO LA DIAMETER INDEX: 1.78 CM/M2
ECHO LA DIAMETER: 3.3 CM
ECHO LA TO AORTIC ROOT RATIO: 0.94
ECHO LA VOL 2C: 49 ML (ref 18–58)
ECHO LA VOL 2C: 50 ML (ref 18–58)
ECHO LA VOL 4C: 33 ML (ref 18–58)
ECHO LA VOL 4C: 36 ML (ref 18–58)
ECHO LA VOLUME AREA LENGTH: 43 ML
ECHO LA VOLUME INDEX AREA LENGTH: 23 ML/M2 (ref 16–34)
ECHO LV E' LATERAL VELOCITY: 13 CM/S
ECHO LV E' SEPTAL VELOCITY: 11 CM/S
ECHO LV EJECTION FRACTION A2C: 60 %
ECHO LV EJECTION FRACTION A4C: 62 %
ECHO LV FRACTIONAL SHORTENING: 37 % (ref 28–44)
ECHO LV INTERNAL DIMENSION DIASTOLE INDEX: 2.49 CM/M2
ECHO LV INTERNAL DIMENSION DIASTOLIC: 4.6 CM (ref 4.2–5.9)
ECHO LV INTERNAL DIMENSION SYSTOLIC INDEX: 1.57 CM/M2
ECHO LV INTERNAL DIMENSION SYSTOLIC: 2.9 CM
ECHO LV IVSD: 1 CM (ref 0.6–1)
ECHO LV MASS 2D: 158.8 G (ref 88–224)
ECHO LV MASS INDEX 2D: 85.8 G/M2 (ref 49–115)
ECHO LV POSTERIOR WALL DIASTOLIC: 1 CM (ref 0.6–1)
ECHO LV RELATIVE WALL THICKNESS RATIO: 0.43
ECHO LVOT AREA: 2.8 CM2
ECHO LVOT DIAM: 1.9 CM
ECHO LVOT PEAK GRADIENT: 4 MMHG
ECHO LVOT PEAK VELOCITY: 1 M/S
ECHO MV A VELOCITY: 0.67 M/S
ECHO MV AREA PHT: 5.2 CM2
ECHO MV E DECELERATION TIME (DT): 145.9 MS
ECHO MV E VELOCITY: 0.86 M/S
ECHO MV E/A RATIO: 1.28
ECHO MV E/E' LATERAL: 6.62
ECHO MV E/E' RATIO (AVERAGED): 7.22
ECHO MV E/E' SEPTAL: 7.82
ECHO MV PRESSURE HALF TIME (PHT): 42.3 MS
ECHO PV MAX VELOCITY: 0.7 M/S
ECHO PV PEAK GRADIENT: 2 MMHG
ECHO RV FREE WALL PEAK S': 9 CM/S
ECHO RV INTERNAL DIMENSION: 3.1 CM
ECHO RV TAPSE: 1.6 CM (ref 1.7–?)
ECHO TV REGURGITANT MAX VELOCITY: 2.54 M/S
ECHO TV REGURGITANT PEAK GRADIENT: 26 MMHG
EKG ATRIAL RATE: 101 BPM
EKG DIAGNOSIS: NORMAL
EKG P AXIS: 67 DEGREES
EKG P-R INTERVAL: 162 MS
EKG Q-T INTERVAL: 352 MS
EKG QRS DURATION: 86 MS
EKG QTC CALCULATION (BAZETT): 456 MS
EKG R AXIS: 45 DEGREES
EKG T AXIS: 38 DEGREES
EKG VENTRICULAR RATE: 101 BPM
ERYTHROCYTE [DISTWIDTH] IN BLOOD BY AUTOMATED COUNT: 11.8 % (ref 11.5–14.5)
EST. AVERAGE GLUCOSE BLD GHB EST-MCNC: 94 MG/DL
GLUCOSE SERPL-MCNC: 103 MG/DL (ref 65–100)
HBA1C MFR BLD: 4.9 % (ref 4–5.6)
HCT VFR BLD AUTO: 43.3 % (ref 36.6–50.3)
HDLC SERPL-MCNC: 62 MG/DL
HDLC SERPL: 2.9 (ref 0–5)
HGB BLD-MCNC: 14.6 G/DL (ref 12.1–17)
LDLC SERPL CALC-MCNC: 110.6 MG/DL (ref 0–100)
MAGNESIUM SERPL-MCNC: 2.2 MG/DL (ref 1.6–2.4)
MCH RBC QN AUTO: 29.4 PG (ref 26–34)
MCHC RBC AUTO-ENTMCNC: 33.7 G/DL (ref 30–36.5)
MCV RBC AUTO: 87.3 FL (ref 80–99)
NRBC # BLD: 0 K/UL (ref 0–0.01)
NRBC BLD-RTO: 0 PER 100 WBC
PHOSPHATE SERPL-MCNC: 3.7 MG/DL (ref 2.6–4.7)
PLATELET # BLD AUTO: 228 K/UL (ref 150–400)
PMV BLD AUTO: 9.5 FL (ref 8.9–12.9)
POTASSIUM SERPL-SCNC: 3.4 MMOL/L (ref 3.5–5.1)
RBC # BLD AUTO: 4.96 M/UL (ref 4.1–5.7)
SODIUM SERPL-SCNC: 140 MMOL/L (ref 136–145)
TRIGL SERPL-MCNC: 47 MG/DL
VLDLC SERPL CALC-MCNC: 9.4 MG/DL
WBC # BLD AUTO: 9.5 K/UL (ref 4.1–11.1)

## 2023-08-04 PROCEDURE — 76377 3D RENDER W/INTRP POSTPROCES: CPT | Performed by: RADIOLOGY

## 2023-08-04 PROCEDURE — 6360000004 HC RX CONTRAST MEDICATION: Performed by: RADIOLOGY

## 2023-08-04 PROCEDURE — 36227 PLACE CATH XTRNL CAROTID: CPT | Performed by: RADIOLOGY

## 2023-08-04 PROCEDURE — 6360000002 HC RX W HCPCS: Performed by: RADIOLOGY

## 2023-08-04 PROCEDURE — C1769 GUIDE WIRE: HCPCS

## 2023-08-04 PROCEDURE — 2580000003 HC RX 258: Performed by: RADIOLOGY

## 2023-08-04 PROCEDURE — 6360000002 HC RX W HCPCS: Performed by: NURSE PRACTITIONER

## 2023-08-04 PROCEDURE — 99233 SBSQ HOSP IP/OBS HIGH 50: CPT

## 2023-08-04 PROCEDURE — 80061 LIPID PANEL: CPT

## 2023-08-04 PROCEDURE — 36415 COLL VENOUS BLD VENIPUNCTURE: CPT

## 2023-08-04 PROCEDURE — B31G1ZZ FLUOROSCOPY OF BILATERAL VERTEBRAL ARTERIES USING LOW OSMOLAR CONTRAST: ICD-10-PCS | Performed by: RADIOLOGY

## 2023-08-04 PROCEDURE — 83735 ASSAY OF MAGNESIUM: CPT

## 2023-08-04 PROCEDURE — B3151ZZ FLUOROSCOPY OF BILATERAL COMMON CAROTID ARTERIES USING LOW OSMOLAR CONTRAST: ICD-10-PCS | Performed by: RADIOLOGY

## 2023-08-04 PROCEDURE — 93306 TTE W/DOPPLER COMPLETE: CPT

## 2023-08-04 PROCEDURE — 6370000000 HC RX 637 (ALT 250 FOR IP): Performed by: NURSE PRACTITIONER

## 2023-08-04 PROCEDURE — 2580000003 HC RX 258: Performed by: NURSE PRACTITIONER

## 2023-08-04 PROCEDURE — 2000000000 HC ICU R&B

## 2023-08-04 PROCEDURE — 36224 PLACE CATH CAROTD ART: CPT | Performed by: RADIOLOGY

## 2023-08-04 PROCEDURE — 6370000000 HC RX 637 (ALT 250 FOR IP)

## 2023-08-04 PROCEDURE — 84100 ASSAY OF PHOSPHORUS: CPT

## 2023-08-04 PROCEDURE — 80048 BASIC METABOLIC PNL TOTAL CA: CPT

## 2023-08-04 PROCEDURE — A4216 STERILE WATER/SALINE, 10 ML: HCPCS | Performed by: NURSE PRACTITIONER

## 2023-08-04 PROCEDURE — 83036 HEMOGLOBIN GLYCOSYLATED A1C: CPT

## 2023-08-04 PROCEDURE — 36226 PLACE CATH VERTEBRAL ART: CPT | Performed by: RADIOLOGY

## 2023-08-04 PROCEDURE — 6360000002 HC RX W HCPCS: Performed by: INTERNAL MEDICINE

## 2023-08-04 PROCEDURE — 2500000003 HC RX 250 WO HCPCS: Performed by: NURSE PRACTITIONER

## 2023-08-04 PROCEDURE — 2500000003 HC RX 250 WO HCPCS: Performed by: RADIOLOGY

## 2023-08-04 PROCEDURE — 92523 SPEECH SOUND LANG COMPREHEN: CPT

## 2023-08-04 PROCEDURE — B31C1ZZ FLUOROSCOPY OF BILATERAL EXTERNAL CAROTID ARTERIES USING LOW OSMOLAR CONTRAST: ICD-10-PCS | Performed by: RADIOLOGY

## 2023-08-04 PROCEDURE — 85027 COMPLETE CBC AUTOMATED: CPT

## 2023-08-04 RX ORDER — SODIUM CHLORIDE 9 MG/ML
INJECTION, SOLUTION INTRAVENOUS CONTINUOUS PRN
Status: COMPLETED | OUTPATIENT
Start: 2023-08-04 | End: 2023-08-04

## 2023-08-04 RX ORDER — FAMOTIDINE 20 MG/1
20 TABLET, FILM COATED ORAL 2 TIMES DAILY
Status: DISCONTINUED | OUTPATIENT
Start: 2023-08-04 | End: 2023-08-11 | Stop reason: HOSPADM

## 2023-08-04 RX ORDER — LEVETIRACETAM 500 MG/1
500 TABLET ORAL 2 TIMES DAILY
Status: DISCONTINUED | OUTPATIENT
Start: 2023-08-04 | End: 2023-08-10

## 2023-08-04 RX ORDER — MAGNESIUM SULFATE 1 G/100ML
1000 INJECTION INTRAVENOUS ONCE
Status: COMPLETED | OUTPATIENT
Start: 2023-08-04 | End: 2023-08-04

## 2023-08-04 RX ORDER — ATORVASTATIN CALCIUM 40 MG/1
40 TABLET, FILM COATED ORAL NIGHTLY
Status: DISCONTINUED | OUTPATIENT
Start: 2023-08-04 | End: 2023-08-11 | Stop reason: HOSPADM

## 2023-08-04 RX ORDER — ALBUTEROL SULFATE 2.5 MG/3ML
2.5 SOLUTION RESPIRATORY (INHALATION) EVERY 6 HOURS PRN
Status: DISCONTINUED | OUTPATIENT
Start: 2023-08-04 | End: 2023-08-11 | Stop reason: HOSPADM

## 2023-08-04 RX ORDER — LABETALOL HYDROCHLORIDE 5 MG/ML
10 INJECTION, SOLUTION INTRAVENOUS EVERY 4 HOURS PRN
Status: DISCONTINUED | OUTPATIENT
Start: 2023-08-04 | End: 2023-08-11 | Stop reason: HOSPADM

## 2023-08-04 RX ORDER — POTASSIUM CHLORIDE 7.45 MG/ML
10 INJECTION INTRAVENOUS
Status: COMPLETED | OUTPATIENT
Start: 2023-08-04 | End: 2023-08-04

## 2023-08-04 RX ORDER — LIDOCAINE HYDROCHLORIDE 10 MG/ML
INJECTION, SOLUTION EPIDURAL; INFILTRATION; INTRACAUDAL; PERINEURAL PRN
Status: COMPLETED | OUTPATIENT
Start: 2023-08-04 | End: 2023-08-04

## 2023-08-04 RX ORDER — BUTALBITAL, ACETAMINOPHEN AND CAFFEINE 50; 325; 40 MG/1; MG/1; MG/1
1 TABLET ORAL EVERY 6 HOURS PRN
Status: DISCONTINUED | OUTPATIENT
Start: 2023-08-04 | End: 2023-08-11 | Stop reason: HOSPADM

## 2023-08-04 RX ORDER — METOCLOPRAMIDE HYDROCHLORIDE 5 MG/ML
5 INJECTION INTRAMUSCULAR; INTRAVENOUS ONCE
Status: COMPLETED | OUTPATIENT
Start: 2023-08-04 | End: 2023-08-04

## 2023-08-04 RX ORDER — MIDAZOLAM HYDROCHLORIDE 2 MG/2ML
INJECTION, SOLUTION INTRAMUSCULAR; INTRAVENOUS PRN
Status: COMPLETED | OUTPATIENT
Start: 2023-08-04 | End: 2023-08-04

## 2023-08-04 RX ORDER — FENTANYL CITRATE 50 UG/ML
INJECTION, SOLUTION INTRAMUSCULAR; INTRAVENOUS PRN
Status: COMPLETED | OUTPATIENT
Start: 2023-08-04 | End: 2023-08-04

## 2023-08-04 RX ADMIN — BUTALBITAL, ACETAMINOPHEN, AND CAFFEINE 1 TABLET: 325; 50; 40 TABLET ORAL at 21:08

## 2023-08-04 RX ADMIN — HEPARIN SODIUM: 1000 INJECTION INTRAVENOUS; SUBCUTANEOUS at 08:53

## 2023-08-04 RX ADMIN — POTASSIUM CHLORIDE 10 MEQ: 10 INJECTION, SOLUTION INTRAVENOUS at 07:58

## 2023-08-04 RX ADMIN — SODIUM CHLORIDE, PRESERVATIVE FREE 10 ML: 5 INJECTION INTRAVENOUS at 21:12

## 2023-08-04 RX ADMIN — IOPAMIDOL 133 ML: 755 INJECTION, SOLUTION INTRAVENOUS at 09:43

## 2023-08-04 RX ADMIN — POTASSIUM CHLORIDE 10 MEQ: 10 INJECTION, SOLUTION INTRAVENOUS at 11:09

## 2023-08-04 RX ADMIN — HEPARIN SODIUM: 1000 INJECTION INTRAVENOUS; SUBCUTANEOUS at 08:54

## 2023-08-04 RX ADMIN — METOCLOPRAMIDE HYDROCHLORIDE 5 MG: 5 INJECTION INTRAMUSCULAR; INTRAVENOUS at 20:55

## 2023-08-04 RX ADMIN — ATORVASTATIN CALCIUM 40 MG: 40 TABLET, FILM COATED ORAL at 20:55

## 2023-08-04 RX ADMIN — LEVETIRACETAM 500 MG: 500 TABLET, FILM COATED ORAL at 20:55

## 2023-08-04 RX ADMIN — FENTANYL CITRATE 50 MCG: 0.05 INJECTION, SOLUTION INTRAMUSCULAR; INTRAVENOUS at 08:56

## 2023-08-04 RX ADMIN — NIMODIPINE 60 MG: 30 CAPSULE, LIQUID FILLED ORAL at 07:57

## 2023-08-04 RX ADMIN — SODIUM CHLORIDE 125 ML/HR: 9 INJECTION, SOLUTION INTRAVENOUS at 08:53

## 2023-08-04 RX ADMIN — POTASSIUM CHLORIDE 10 MEQ: 10 INJECTION, SOLUTION INTRAVENOUS at 06:50

## 2023-08-04 RX ADMIN — FAMOTIDINE 20 MG: 20 TABLET ORAL at 20:55

## 2023-08-04 RX ADMIN — NIMODIPINE 60 MG: 30 CAPSULE, LIQUID FILLED ORAL at 11:52

## 2023-08-04 RX ADMIN — ONDANSETRON HYDROCHLORIDE 4 MG: 2 INJECTION, SOLUTION INTRAMUSCULAR; INTRAVENOUS at 12:30

## 2023-08-04 RX ADMIN — NIMODIPINE 60 MG: 30 CAPSULE, LIQUID FILLED ORAL at 00:43

## 2023-08-04 RX ADMIN — LEVETIRACETAM 500 MG: 100 INJECTION, SOLUTION INTRAVENOUS at 07:58

## 2023-08-04 RX ADMIN — MAGNESIUM SULFATE HEPTAHYDRATE 1000 MG: 1 INJECTION, SOLUTION INTRAVENOUS at 21:07

## 2023-08-04 RX ADMIN — NIMODIPINE 60 MG: 30 CAPSULE, LIQUID FILLED ORAL at 20:30

## 2023-08-04 RX ADMIN — ACETAMINOPHEN 650 MG: 325 TABLET ORAL at 17:20

## 2023-08-04 RX ADMIN — SODIUM CHLORIDE, PRESERVATIVE FREE 10 ML: 5 INJECTION INTRAVENOUS at 08:15

## 2023-08-04 RX ADMIN — POTASSIUM CHLORIDE 10 MEQ: 10 INJECTION, SOLUTION INTRAVENOUS at 05:50

## 2023-08-04 RX ADMIN — ACETAMINOPHEN 650 MG: 325 TABLET ORAL at 11:16

## 2023-08-04 RX ADMIN — MIDAZOLAM HYDROCHLORIDE 1 MG: 1 INJECTION, SOLUTION INTRAMUSCULAR; INTRAVENOUS at 08:56

## 2023-08-04 RX ADMIN — NIMODIPINE 60 MG: 30 CAPSULE, LIQUID FILLED ORAL at 16:06

## 2023-08-04 RX ADMIN — LIDOCAINE HYDROCHLORIDE 10 ML: 10 INJECTION, SOLUTION EPIDURAL; INFILTRATION; INTRACAUDAL; PERINEURAL at 09:01

## 2023-08-04 RX ADMIN — FAMOTIDINE 20 MG: 10 INJECTION, SOLUTION INTRAVENOUS at 07:58

## 2023-08-04 ASSESSMENT — PAIN DESCRIPTION - PAIN TYPE
TYPE: ACUTE PAIN

## 2023-08-04 ASSESSMENT — PAIN DESCRIPTION - DESCRIPTORS
DESCRIPTORS: SHARP
DESCRIPTORS: ACHING
DESCRIPTORS: SHARP
DESCRIPTORS: ACHING
DESCRIPTORS: SHARP

## 2023-08-04 ASSESSMENT — PAIN DESCRIPTION - ORIENTATION
ORIENTATION: ANTERIOR
ORIENTATION: ANTERIOR
ORIENTATION: LEFT;RIGHT
ORIENTATION: ANTERIOR

## 2023-08-04 ASSESSMENT — PAIN SCALES - GENERAL
PAINLEVEL_OUTOF10: 2
PAINLEVEL_OUTOF10: 4
PAINLEVEL_OUTOF10: 2
PAINLEVEL_OUTOF10: 3
PAINLEVEL_OUTOF10: 3
PAINLEVEL_OUTOF10: 1
PAINLEVEL_OUTOF10: 1
PAINLEVEL_OUTOF10: 3
PAINLEVEL_OUTOF10: 1

## 2023-08-04 ASSESSMENT — PAIN - FUNCTIONAL ASSESSMENT
PAIN_FUNCTIONAL_ASSESSMENT: ACTIVITIES ARE NOT PREVENTED

## 2023-08-04 ASSESSMENT — PAIN DESCRIPTION - FREQUENCY
FREQUENCY: CONTINUOUS
FREQUENCY: INTERMITTENT
FREQUENCY: INTERMITTENT

## 2023-08-04 ASSESSMENT — PAIN DESCRIPTION - ONSET
ONSET: ON-GOING
ONSET: SUDDEN

## 2023-08-04 ASSESSMENT — PAIN DESCRIPTION - LOCATION
LOCATION: HEAD

## 2023-08-04 NOTE — ED NOTES
TRANSFER - OUT REPORT:    Verbal report given to formerly [facility] RN on Jalyn Howard  being transferred to ICU for routine progression of patient care       Report consisted of patient's Situation, Background, Assessment and   Recommendations(SBAR). Information from the following report(s) ED Encounter Summary, ED SBAR, [facility], and Recent Results was reviewed with the receiving nurse. [place] Fall Assessment:    Presents to emergency department  because of falls (Syncope, seizure, or loss of consciousness): No  Age > 70: No  Altered Mental Status, Intoxication with alcohol or substance confusion (Disorientation, impaired judgment, poor safety awaremess, or inability to follow instructions): No  Impaired Mobility: Ambulates or transfers with assistive devices or assistance; Unable to ambulate or transer.: No  Nursing Judgement: No          Lines:   Peripheral IV 08/03/23 Distal;Left Cephalic (Active)   Site Assessment Clean, dry & intact 08/03/23 1727   Line Status Blood return noted;Specimen collected; Flushed;Normal saline locked 08/03/23 1727   Phlebitis Assessment No symptoms 08/03/23 1727   Infiltration Assessment 0 08/03/23 1727   Dressing Status New dressing applied 08/03/23 1727   Dressing Type Transparent 08/03/23 1727   Dressing Intervention New 08/03/23 1727       Peripheral IV 08/03/23 Right Antecubital (Active)        Opportunity for questions and clarification was provided.       Patient transported with:  Monitor and Registered Nurse           Minta Boeck, RN  08/04/23 [id number]

## 2023-08-04 NOTE — CONSULTS
42yo with acute onset headache and neck pain at 4pm.  Head CT shows sah suggestive of aneurysm. No hydrocephalus. Will defer management to neurointerventional radiology and we will be available as needed.
Neurology does not manage SAH.
Imagin/3/23 CT Head  Acute basal CSF cisterns subarachnoid hemorrhage. No associated intraparenchymal, subdural/epidural hematomas or mass effect. No evidence of acute infarct. 8/3/23 CT Angio Head and Neck   There is no major vessel occlusion. 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 Repeat CTA of the head on a nonemergent basis recommended. There is no dissection or hemodynamically significant stenosis demonstrated. .  There is no intracranial mass, hemorrhage or evidence of acute infarction. No acute intracranial process is identified. .    Assessment and Plan   #Subarachnoid Hemorrhage, Cartwright Hough 1   - plan for cerebral angiogram on  by Dr. Fischer Pulling   - NPO until diagnostic cerebral angiogram is done   - Day 1/21 of Nimotop to prevent delayed cerebral ischemia   - Keppra 500 mg BID for seizure ppx   - NaCI at 75 ml/hr to maintain euvolemia   - Strict I&O   - ECHO pending   - q 1 neuro checks   - TCDs daily ( to start tomorrow after DSA)    - SBP goal <140,Kin/Levo/Cardene/Hydralazine/Labetalol PRN, allow for permissive HTN in the setting of vasospasm    - PT/OT/SLP evals   - Assess NICOM q shift for fluid responsiveness   - Assess pupils with pupillometer q 4 hours      Patient discussed with attending physician Dr. Lise Pallas By: SIMONE Campos - CNP     August 3, 2023      Please note that this dictation was completed with Jm Gamez, the computer voice recognition software. Quite often unanticipated grammatical, syntax, homophones, and other interpretive errors are inadvertently transcribed by the computer software. Please disregard these errors. Please excuse any errors that have escaped final proofreading.

## 2023-08-05 ENCOUNTER — APPOINTMENT (OUTPATIENT)
Facility: HOSPITAL | Age: 43
DRG: 065 | End: 2023-08-05
Payer: COMMERCIAL

## 2023-08-05 LAB
ANION GAP SERPL CALC-SCNC: 5 MMOL/L (ref 5–15)
BUN SERPL-MCNC: 7 MG/DL (ref 6–20)
BUN/CREAT SERPL: 12 (ref 12–20)
CALCIUM SERPL-MCNC: 6.6 MG/DL (ref 8.5–10.1)
CHLORIDE SERPL-SCNC: 118 MMOL/L (ref 97–108)
CO2 SERPL-SCNC: 21 MMOL/L (ref 21–32)
CREAT SERPL-MCNC: 0.57 MG/DL (ref 0.7–1.3)
ECHO BSA: 1.88 M2
ERYTHROCYTE [DISTWIDTH] IN BLOOD BY AUTOMATED COUNT: 11.8 % (ref 11.5–14.5)
GLUCOSE SERPL-MCNC: 73 MG/DL (ref 65–100)
HCT VFR BLD AUTO: 37.1 % (ref 36.6–50.3)
HGB BLD-MCNC: 12.2 G/DL (ref 12.1–17)
MAGNESIUM SERPL-MCNC: 2 MG/DL (ref 1.6–2.4)
MCH RBC QN AUTO: 29.3 PG (ref 26–34)
MCHC RBC AUTO-ENTMCNC: 32.9 G/DL (ref 30–36.5)
MCV RBC AUTO: 89 FL (ref 80–99)
NRBC # BLD: 0 K/UL (ref 0–0.01)
NRBC BLD-RTO: 0 PER 100 WBC
PHOSPHATE SERPL-MCNC: 2.1 MG/DL (ref 2.6–4.7)
PLATELET # BLD AUTO: 173 K/UL (ref 150–400)
PMV BLD AUTO: 9.5 FL (ref 8.9–12.9)
POTASSIUM SERPL-SCNC: 3 MMOL/L (ref 3.5–5.1)
RBC # BLD AUTO: 4.17 M/UL (ref 4.1–5.7)
SODIUM SERPL-SCNC: 144 MMOL/L (ref 136–145)
VAS BASILAR ARTERY EDV: 18.8 CM/S
VAS BASILAR ARTERY MEAN VEL: 27 CM/S
VAS BASILAR ARTERY PSV: 42.1 CM/S
VAS LEFT ACA EDV: 30.6 CM/S
VAS LEFT ACA MEAN VEL: 43.4 CM/S
VAS LEFT ACA PSV: 69 CM/S
VAS LEFT EX ICA MEAN VEL: 36 CM/S
VAS LEFT ICA DIST EDV: 25.2 CM/S
VAS LEFT ICA DIST PSV: 57.6 CM/S
VAS LEFT ICA EDV: 30.9 CM/S
VAS LEFT ICA MEAN VEL: 40.1 CM/S
VAS LEFT ICA PSV: 58.4 CM/S
VAS LEFT LINDEGAARD RATIO: 1.6
VAS LEFT MCA 1 EDV: 40.4 CM/S
VAS LEFT MCA 1 MEAN VEL: 55.5 CM/S
VAS LEFT MCA 1 PSV: 85.7 CM/S
VAS LEFT PCA 1 EDV: 27.7 CM/S
VAS LEFT PCA 1 MEAN VEL: 35 CM/S
VAS LEFT PCA 1 PSV: 49.6 CM/S
VAS LEFT VERTEBRAL EDV TCD: 16.6 CM/S
VAS LEFT VERTEBRAL MEAN VEL: 23.5 CM/S
VAS LEFT VERTEBRAL PSV TCD: 37.3 CM/S
VAS RIGHT ACA EDV: 37.4 CM/S
VAS RIGHT ACA MEAN VEL: 50.4 CM/S
VAS RIGHT ACA PSV: 76.3 CM/S
VAS RIGHT EX ICA MEAN VEL: 35 CM/S
VAS RIGHT ICA DIST EDV: 24 CM/S
VAS RIGHT ICA DIST PSV: 56 CM/S
VAS RIGHT ICA EDV: 25.8 CM/S
VAS RIGHT ICA MEAN VEL: 37.4 CM/S
VAS RIGHT ICA PSV: 60.7 CM/S
VAS RIGHT LINDEGAARD RATIO: 1.7
VAS RIGHT MCA 1 EDV: 45.2 CM/S
VAS RIGHT MCA 1 MEAN VEL: 60.7 CM/S
VAS RIGHT MCA 1 PSV: 91.8 CM/S
VAS RIGHT PCA 1 EDV: 37.4 CM/S
VAS RIGHT PCA 1 MEAN VEL: 48.2 CM/S
VAS RIGHT PCA 1 PSV: 69.8 CM/S
VAS RIGHT VERTEBRAL EDV TCD: 14 CM/S
VAS RIGHT VERTEBRAL MEAN VEL: 19.6 CM/S
VAS RIGHT VERTEBRAL PSV TCD: 30.8 CM/S
WBC # BLD AUTO: 5.9 K/UL (ref 4.1–11.1)

## 2023-08-05 PROCEDURE — 6370000000 HC RX 637 (ALT 250 FOR IP): Performed by: NURSE PRACTITIONER

## 2023-08-05 PROCEDURE — 2580000003 HC RX 258: Performed by: NURSE PRACTITIONER

## 2023-08-05 PROCEDURE — 99233 SBSQ HOSP IP/OBS HIGH 50: CPT

## 2023-08-05 PROCEDURE — 2000000000 HC ICU R&B

## 2023-08-05 PROCEDURE — 6360000002 HC RX W HCPCS: Performed by: NURSE PRACTITIONER

## 2023-08-05 PROCEDURE — 84100 ASSAY OF PHOSPHORUS: CPT

## 2023-08-05 PROCEDURE — 97535 SELF CARE MNGMENT TRAINING: CPT

## 2023-08-05 PROCEDURE — 2500000003 HC RX 250 WO HCPCS: Performed by: NURSE PRACTITIONER

## 2023-08-05 PROCEDURE — A9579 GAD-BASE MR CONTRAST NOS,1ML: HCPCS

## 2023-08-05 PROCEDURE — 83735 ASSAY OF MAGNESIUM: CPT

## 2023-08-05 PROCEDURE — 6360000004 HC RX CONTRAST MEDICATION

## 2023-08-05 PROCEDURE — 70553 MRI BRAIN STEM W/O & W/DYE: CPT

## 2023-08-05 PROCEDURE — 93886 INTRACRANIAL COMPLETE STUDY: CPT

## 2023-08-05 PROCEDURE — 80048 BASIC METABOLIC PNL TOTAL CA: CPT

## 2023-08-05 PROCEDURE — 97116 GAIT TRAINING THERAPY: CPT

## 2023-08-05 PROCEDURE — 97165 OT EVAL LOW COMPLEX 30 MIN: CPT

## 2023-08-05 PROCEDURE — 36415 COLL VENOUS BLD VENIPUNCTURE: CPT

## 2023-08-05 PROCEDURE — 97161 PT EVAL LOW COMPLEX 20 MIN: CPT

## 2023-08-05 PROCEDURE — 72156 MRI NECK SPINE W/O & W/DYE: CPT

## 2023-08-05 PROCEDURE — 6370000000 HC RX 637 (ALT 250 FOR IP)

## 2023-08-05 PROCEDURE — 85027 COMPLETE CBC AUTOMATED: CPT

## 2023-08-05 RX ORDER — SODIUM CHLORIDE 0.9 % (FLUSH) 0.9 %
10 SYRINGE (ML) INJECTION ONCE
Status: COMPLETED | OUTPATIENT
Start: 2023-08-05 | End: 2023-08-05

## 2023-08-05 RX ORDER — MAGNESIUM SULFATE 1 G/100ML
1000 INJECTION INTRAVENOUS ONCE
Status: COMPLETED | OUTPATIENT
Start: 2023-08-05 | End: 2023-08-05

## 2023-08-05 RX ORDER — CALCIUM GLUCONATE 20 MG/ML
1000 INJECTION, SOLUTION INTRAVENOUS ONCE
Status: COMPLETED | OUTPATIENT
Start: 2023-08-05 | End: 2023-08-05

## 2023-08-05 RX ORDER — POTASSIUM CHLORIDE 750 MG/1
40 TABLET, FILM COATED, EXTENDED RELEASE ORAL ONCE
Status: COMPLETED | OUTPATIENT
Start: 2023-08-05 | End: 2023-08-05

## 2023-08-05 RX ADMIN — CALCIUM GLUCONATE 1000 MG: 20 INJECTION, SOLUTION INTRAVENOUS at 07:21

## 2023-08-05 RX ADMIN — FAMOTIDINE 20 MG: 20 TABLET ORAL at 07:48

## 2023-08-05 RX ADMIN — SODIUM CHLORIDE, PRESERVATIVE FREE 10 ML: 5 INJECTION INTRAVENOUS at 07:48

## 2023-08-05 RX ADMIN — SODIUM CHLORIDE, PRESERVATIVE FREE 10 ML: 5 INJECTION INTRAVENOUS at 21:08

## 2023-08-05 RX ADMIN — BUTALBITAL, ACETAMINOPHEN, AND CAFFEINE 1 TABLET: 325; 50; 40 TABLET ORAL at 05:33

## 2023-08-05 RX ADMIN — SODIUM CHLORIDE, PRESERVATIVE FREE 10 ML: 5 INJECTION INTRAVENOUS at 10:52

## 2023-08-05 RX ADMIN — NIMODIPINE 60 MG: 30 CAPSULE, LIQUID FILLED ORAL at 00:03

## 2023-08-05 RX ADMIN — GADOTERIDOL 15 ML: 279.3 INJECTION, SOLUTION INTRAVENOUS at 10:51

## 2023-08-05 RX ADMIN — LEVETIRACETAM 500 MG: 500 TABLET, FILM COATED ORAL at 08:30

## 2023-08-05 RX ADMIN — NIMODIPINE 60 MG: 30 CAPSULE, LIQUID FILLED ORAL at 21:07

## 2023-08-05 RX ADMIN — POTASSIUM PHOSPHATE, MONOBASIC POTASSIUM PHOSPHATE, DIBASIC 15 MMOL: 224; 236 INJECTION, SOLUTION, CONCENTRATE INTRAVENOUS at 07:21

## 2023-08-05 RX ADMIN — SODIUM CHLORIDE: 9 INJECTION, SOLUTION INTRAVENOUS at 15:55

## 2023-08-05 RX ADMIN — FAMOTIDINE 20 MG: 20 TABLET ORAL at 21:08

## 2023-08-05 RX ADMIN — POTASSIUM CHLORIDE 40 MEQ: 750 TABLET, FILM COATED, EXTENDED RELEASE ORAL at 07:03

## 2023-08-05 RX ADMIN — NIMODIPINE 60 MG: 30 CAPSULE, LIQUID FILLED ORAL at 15:52

## 2023-08-05 RX ADMIN — NIMODIPINE 60 MG: 30 CAPSULE, LIQUID FILLED ORAL at 07:48

## 2023-08-05 RX ADMIN — NIMODIPINE 60 MG: 30 CAPSULE, LIQUID FILLED ORAL at 12:22

## 2023-08-05 RX ADMIN — ATORVASTATIN CALCIUM 40 MG: 40 TABLET, FILM COATED ORAL at 21:08

## 2023-08-05 RX ADMIN — NIMODIPINE 60 MG: 30 CAPSULE, LIQUID FILLED ORAL at 04:00

## 2023-08-05 RX ADMIN — LEVETIRACETAM 500 MG: 500 TABLET, FILM COATED ORAL at 21:08

## 2023-08-05 RX ADMIN — MAGNESIUM SULFATE HEPTAHYDRATE 1000 MG: 1 INJECTION, SOLUTION INTRAVENOUS at 07:51

## 2023-08-05 RX ADMIN — SODIUM CHLORIDE: 9 INJECTION, SOLUTION INTRAVENOUS at 01:34

## 2023-08-05 ASSESSMENT — PAIN DESCRIPTION - ORIENTATION
ORIENTATION: ANTERIOR
ORIENTATION: ANTERIOR

## 2023-08-05 ASSESSMENT — PAIN SCALES - GENERAL
PAINLEVEL_OUTOF10: 0
PAINLEVEL_OUTOF10: 3
PAINLEVEL_OUTOF10: 1
PAINLEVEL_OUTOF10: 0

## 2023-08-05 ASSESSMENT — PAIN DESCRIPTION - LOCATION
LOCATION: HEAD
LOCATION: HEAD

## 2023-08-05 ASSESSMENT — PAIN DESCRIPTION - DESCRIPTORS: DESCRIPTORS: ACHING

## 2023-08-06 ENCOUNTER — APPOINTMENT (OUTPATIENT)
Facility: HOSPITAL | Age: 43
DRG: 065 | End: 2023-08-06
Payer: COMMERCIAL

## 2023-08-06 LAB
ANION GAP SERPL CALC-SCNC: 4 MMOL/L (ref 5–15)
BUN SERPL-MCNC: 14 MG/DL (ref 6–20)
BUN/CREAT SERPL: 17 (ref 12–20)
CALCIUM SERPL-MCNC: 8.9 MG/DL (ref 8.5–10.1)
CHLORIDE SERPL-SCNC: 113 MMOL/L (ref 97–108)
CO2 SERPL-SCNC: 20 MMOL/L (ref 21–32)
CREAT SERPL-MCNC: 0.81 MG/DL (ref 0.7–1.3)
ERYTHROCYTE [DISTWIDTH] IN BLOOD BY AUTOMATED COUNT: 11.8 % (ref 11.5–14.5)
GLUCOSE SERPL-MCNC: 95 MG/DL (ref 65–100)
HCT VFR BLD AUTO: 49.6 % (ref 36.6–50.3)
HGB BLD-MCNC: 16.5 G/DL (ref 12.1–17)
MAGNESIUM SERPL-MCNC: 2.4 MG/DL (ref 1.6–2.4)
MCH RBC QN AUTO: 29.3 PG (ref 26–34)
MCHC RBC AUTO-ENTMCNC: 33.3 G/DL (ref 30–36.5)
MCV RBC AUTO: 88.1 FL (ref 80–99)
NRBC # BLD: 0 K/UL (ref 0–0.01)
NRBC BLD-RTO: 0 PER 100 WBC
PHOSPHATE SERPL-MCNC: 2.7 MG/DL (ref 2.6–4.7)
PLATELET # BLD AUTO: 229 K/UL (ref 150–400)
PMV BLD AUTO: 9.3 FL (ref 8.9–12.9)
POTASSIUM SERPL-SCNC: 4.3 MMOL/L (ref 3.5–5.1)
RBC # BLD AUTO: 5.63 M/UL (ref 4.1–5.7)
SODIUM SERPL-SCNC: 137 MMOL/L (ref 136–145)
WBC # BLD AUTO: 8.8 K/UL (ref 4.1–11.1)

## 2023-08-06 PROCEDURE — 99232 SBSQ HOSP IP/OBS MODERATE 35: CPT

## 2023-08-06 PROCEDURE — 6370000000 HC RX 637 (ALT 250 FOR IP): Performed by: NURSE PRACTITIONER

## 2023-08-06 PROCEDURE — 84100 ASSAY OF PHOSPHORUS: CPT

## 2023-08-06 PROCEDURE — 85027 COMPLETE CBC AUTOMATED: CPT

## 2023-08-06 PROCEDURE — 80048 BASIC METABOLIC PNL TOTAL CA: CPT

## 2023-08-06 PROCEDURE — 2580000003 HC RX 258: Performed by: NURSE PRACTITIONER

## 2023-08-06 PROCEDURE — 99233 SBSQ HOSP IP/OBS HIGH 50: CPT

## 2023-08-06 PROCEDURE — 2060000000 HC ICU INTERMEDIATE R&B

## 2023-08-06 PROCEDURE — 36415 COLL VENOUS BLD VENIPUNCTURE: CPT

## 2023-08-06 PROCEDURE — 6370000000 HC RX 637 (ALT 250 FOR IP)

## 2023-08-06 PROCEDURE — 93886 INTRACRANIAL COMPLETE STUDY: CPT

## 2023-08-06 PROCEDURE — 83735 ASSAY OF MAGNESIUM: CPT

## 2023-08-06 RX ADMIN — SODIUM CHLORIDE, PRESERVATIVE FREE 10 ML: 5 INJECTION INTRAVENOUS at 08:25

## 2023-08-06 RX ADMIN — ACETAMINOPHEN 650 MG: 325 TABLET ORAL at 21:34

## 2023-08-06 RX ADMIN — NIMODIPINE 60 MG: 30 CAPSULE, LIQUID FILLED ORAL at 08:25

## 2023-08-06 RX ADMIN — NIMODIPINE 60 MG: 30 CAPSULE, LIQUID FILLED ORAL at 04:24

## 2023-08-06 RX ADMIN — FAMOTIDINE 20 MG: 20 TABLET ORAL at 08:25

## 2023-08-06 RX ADMIN — FAMOTIDINE 20 MG: 20 TABLET ORAL at 21:16

## 2023-08-06 RX ADMIN — NIMODIPINE 60 MG: 30 CAPSULE, LIQUID FILLED ORAL at 18:02

## 2023-08-06 RX ADMIN — NIMODIPINE 60 MG: 30 CAPSULE, LIQUID FILLED ORAL at 12:20

## 2023-08-06 RX ADMIN — NIMODIPINE 60 MG: 30 CAPSULE, LIQUID FILLED ORAL at 21:16

## 2023-08-06 RX ADMIN — BUTALBITAL, ACETAMINOPHEN, AND CAFFEINE 1 TABLET: 325; 50; 40 TABLET ORAL at 08:36

## 2023-08-06 RX ADMIN — ATORVASTATIN CALCIUM 40 MG: 40 TABLET, FILM COATED ORAL at 21:17

## 2023-08-06 RX ADMIN — LEVETIRACETAM 500 MG: 500 TABLET, FILM COATED ORAL at 08:37

## 2023-08-06 RX ADMIN — BUTALBITAL, ACETAMINOPHEN, AND CAFFEINE 1 TABLET: 325; 50; 40 TABLET ORAL at 17:19

## 2023-08-06 RX ADMIN — LEVETIRACETAM 500 MG: 500 TABLET, FILM COATED ORAL at 21:16

## 2023-08-06 RX ADMIN — NIMODIPINE 60 MG: 30 CAPSULE, LIQUID FILLED ORAL at 00:52

## 2023-08-06 RX ADMIN — SODIUM CHLORIDE, PRESERVATIVE FREE 10 ML: 5 INJECTION INTRAVENOUS at 21:17

## 2023-08-06 ASSESSMENT — PAIN DESCRIPTION - ORIENTATION: ORIENTATION: POSTERIOR

## 2023-08-06 ASSESSMENT — PAIN DESCRIPTION - DESCRIPTORS: DESCRIPTORS: ACHING

## 2023-08-06 ASSESSMENT — PAIN SCALES - GENERAL
PAINLEVEL_OUTOF10: 3
PAINLEVEL_OUTOF10: 0
PAINLEVEL_OUTOF10: 3

## 2023-08-06 ASSESSMENT — PAIN DESCRIPTION - LOCATION
LOCATION: HEAD
LOCATION: HEAD

## 2023-08-06 NOTE — H&P
History and Physical    Date of Service:  8/6/2023  Primary Care Provider: Steph Daniels MD  Source of information: The patient and Chart review    Chief Complaint: Headache      History of Presenting Illness:   Daniel Clinton is a 37 y.o. male with medical hx of asthma and hypertension was admitted to the hospital on 8/3 with sudden onset headache. Head CT on admission revealed acute basal CSF cisterns subarachnoids hemorrhage. CTA of head did not show aneurysm, dissection or intracranial mass. Pt underwent for cerebral/cervical arteriogram on 8/4 then admitted to the ICU for close monitoring. Hospitlist team was consulted for down grade care from ICU    During visit, pt c/o occasional headache which resolved by Fioricet. The patient denies any  blurry vision, sore throat, trouble swallowing, trouble with speech, chest pain, SOB, cough, fever, chills, N/V/D, abd pain, urinary symptoms, or constipation. No hx of smoking or etoh consumption. REVIEW OF SYSTEMS:  A comprehensive review of systems was negative except for that written in the History of Present Illness. Past Medical History:   Diagnosis Date    Asthma     Hypertension       Past Surgical History:   Procedure Laterality Date    HEENT      wisdom teeth    ORTHOPEDIC SURGERY  2003 and unknown    left impacke lulnar sydrome x2     Prior to Admission medications    Medication Sig Start Date End Date Taking? Authorizing Provider   albuterol sulfate HFA (PROVENTIL;VENTOLIN;PROAIR) 108 (90 Base) MCG/ACT inhaler Inhale 2 puffs into the lungs    Ar Automatic Reconciliation   lisinopril (PRINIVIL;ZESTRIL) 10 MG tablet Take 1 tablet by mouth daily    Ar Automatic Reconciliation   oxyCODONE-acetaminophen (PERCOCET) 5-325 MG per tablet Take 2 tablets by mouth every 4 hours as needed. Max Daily Amount: 12 tablets 3/19/14   Ar Automatic Reconciliation     No Known Allergies   History reviewed. No pertinent family history.
Pepcid    RENAL/ELECTROLYTE/FLUIDS:   Keep K>4; Mg>2    Trend renal indices/ UOP  Trend Chemistry  Replace e-lytes as needed    ENDOCRINE:   Glycemic Control: Goal 120-180: SSI PRN  Prevent hypoglycemia    HEMATOLOGY/ONCOLOGY:   Transfusion Trigger (Hgb): 7.0  Trend CBC  PT/INR  normal    INFECTIOUS DISEASE:   No concern for infection at this time  ANTIBIOTICS TO DATE: None    CULTURES TO DATE: None    ICU DAILY CHECKLIST   Code Status: Full code  DVT Prophylaxis: SCD  T/L/D: Tubes: None  Lines: PIV  Drains: None  SUP: Pepcid  Diet: N.p.o. Activity Level: Bedrest, PT OT to see after angiogram tomorrow.   ABCDEF Bundle/Checklist Completed: Yes  Disposition: Stay in ICU   Multidisciplinary Rounds Completed: Pending  Patient/Family Updated: Yes, patient and wife at bedside  Goals of Care Discussion/Palliative: Santiago Avendano   8/3/2023-admit patient to ICU for [address]:  A comprehensive review of systems was negative except for: Neurological: positive for headaches    OBJECTIVE   Labs and Data: Reviewed 08/03/23  Medications: Reviewed 08/03/23  Imaging: Reviewed 08/03/23    Physical Exam:  General appearance - acyanotic, in no respiratory distress  Mental status - alert, oriented to person, place, and time  Eyes - pupils equal and reactive, extraocular eye movements intact  Mouth - mucous membranes moist, pharynx normal without lesions  Neck - supple, no significant adenopathy  Chest - clear to auscultation, no wheezes, rales or rhonchi, symmetric air entry  Heart - normal rate, regular rhythm, normal S1, S2, no murmurs, rubs, clicks or gallops  Abdomen - soft, nontender, nondistended, no masses or organomegaly  Neurological - alert, oriented, normal speech, no focal findings or movement disorder noted  Musculoskeletal - no joint tenderness, deformity or swelling  Extremities - peripheral pulses normal, no pedal edema, no clubbing or cyanosis  Skin - normal coloration and turgor,

## 2023-08-07 ENCOUNTER — APPOINTMENT (OUTPATIENT)
Facility: HOSPITAL | Age: 43
DRG: 065 | End: 2023-08-07
Payer: COMMERCIAL

## 2023-08-07 LAB
ANION GAP SERPL CALC-SCNC: 4 MMOL/L (ref 5–15)
BUN SERPL-MCNC: 13 MG/DL (ref 6–20)
BUN/CREAT SERPL: 15 (ref 12–20)
CALCIUM SERPL-MCNC: 9.2 MG/DL (ref 8.5–10.1)
CHLORIDE SERPL-SCNC: 112 MMOL/L (ref 97–108)
CO2 SERPL-SCNC: 23 MMOL/L (ref 21–32)
CREAT SERPL-MCNC: 0.89 MG/DL (ref 0.7–1.3)
ECHO BSA: 1.88 M2
ECHO BSA: 1.88 M2
ERYTHROCYTE [DISTWIDTH] IN BLOOD BY AUTOMATED COUNT: 11.7 % (ref 11.5–14.5)
GLUCOSE SERPL-MCNC: 96 MG/DL (ref 65–100)
HCT VFR BLD AUTO: 47.8 % (ref 36.6–50.3)
HGB BLD-MCNC: 16 G/DL (ref 12.1–17)
MAGNESIUM SERPL-MCNC: 2.2 MG/DL (ref 1.6–2.4)
MCH RBC QN AUTO: 29.4 PG (ref 26–34)
MCHC RBC AUTO-ENTMCNC: 33.5 G/DL (ref 30–36.5)
MCV RBC AUTO: 87.7 FL (ref 80–99)
NRBC # BLD: 0 K/UL (ref 0–0.01)
NRBC BLD-RTO: 0 PER 100 WBC
PLATELET # BLD AUTO: 213 K/UL (ref 150–400)
PMV BLD AUTO: 9.2 FL (ref 8.9–12.9)
POTASSIUM SERPL-SCNC: 4.2 MMOL/L (ref 3.5–5.1)
RBC # BLD AUTO: 5.45 M/UL (ref 4.1–5.7)
SODIUM SERPL-SCNC: 139 MMOL/L (ref 136–145)
VAS BASILAR ARTERY EDV: 13.9 CM/S
VAS BASILAR ARTERY EDV: 24.3 CM/S
VAS BASILAR ARTERY MEAN VEL: 22 CM/S
VAS BASILAR ARTERY MEAN VEL: 34 CM/S
VAS BASILAR ARTERY PSV: 37.2 CM/S
VAS BASILAR ARTERY PSV: 53.5 CM/S
VAS LEFT ACA EDV: 32.2 CM/S
VAS LEFT ACA EDV: 39.5 CM/S
VAS LEFT ACA MEAN VEL: 44.1 CM/S
VAS LEFT ACA MEAN VEL: 51.4 CM/S
VAS LEFT ACA PSV: 67.9 CM/S
VAS LEFT ACA PSV: 75.2 CM/S
VAS LEFT EX ICA MEAN VEL: 34 CM/S
VAS LEFT EX ICA MEAN VEL: 39 CM/S
VAS LEFT ICA DIST EDV: 23.4 CM/S
VAS LEFT ICA DIST EDV: 27.7 CM/S
VAS LEFT ICA DIST PSV: 55.3 CM/S
VAS LEFT ICA DIST PSV: 59.5 CM/S
VAS LEFT ICA EDV: 27 CM/S
VAS LEFT ICA EDV: 32.2 CM/S
VAS LEFT ICA MEAN VEL: 41 CM/S
VAS LEFT ICA MEAN VEL: 45.7 CM/S
VAS LEFT ICA PSV: 69 CM/S
VAS LEFT ICA PSV: 72.8 CM/S
VAS LEFT LINDEGAARD RATIO: 1.4
VAS LEFT LINDEGAARD RATIO: 1.7
VAS LEFT MCA 1 EDV: 34.1 CM/S
VAS LEFT MCA 1 EDV: 49.6 CM/S
VAS LEFT MCA 1 MEAN VEL: 47.2 CM/S
VAS LEFT MCA 1 MEAN VEL: 68.2 CM/S
VAS LEFT MCA 1 PSV: 105.4 CM/S
VAS LEFT MCA 1 PSV: 73.3 CM/S
VAS LEFT PCA 1 EDV: 22.5 CM/S
VAS LEFT PCA 1 EDV: 30 CM/S
VAS LEFT PCA 1 MEAN VEL: 31 CM/S
VAS LEFT PCA 1 MEAN VEL: 40.2 CM/S
VAS LEFT PCA 1 PSV: 48 CM/S
VAS LEFT PCA 1 PSV: 60.7 CM/S
VAS LEFT VERTEBRAL EDV TCD: 14.7 CM/S
VAS LEFT VERTEBRAL EDV TCD: 15.2 CM/S
VAS LEFT VERTEBRAL MEAN VEL: 19.4 CM/S
VAS LEFT VERTEBRAL MEAN VEL: 21.6 CM/S
VAS LEFT VERTEBRAL PSV TCD: 28.8 CM/S
VAS LEFT VERTEBRAL PSV TCD: 34.3 CM/S
VAS RIGHT ACA EDV: 36.9 CM/S
VAS RIGHT ACA EDV: 44.4 CM/S
VAS RIGHT ACA MEAN VEL: 49 CM/S
VAS RIGHT ACA MEAN VEL: 56.9 CM/S
VAS RIGHT ACA PSV: 73.1 CM/S
VAS RIGHT ACA PSV: 82 CM/S
VAS RIGHT EX ICA MEAN VEL: 32 CM/S
VAS RIGHT EX ICA MEAN VEL: 35 CM/S
VAS RIGHT ICA DIST EDV: 20.6 CM/S
VAS RIGHT ICA DIST EDV: 24.4 CM/S
VAS RIGHT ICA DIST PSV: 53.5 CM/S
VAS RIGHT ICA DIST PSV: 57.3 CM/S
VAS RIGHT ICA EDV: 34.7 CM/S
VAS RIGHT ICA EDV: 39.8 CM/S
VAS RIGHT ICA MEAN VEL: 49 CM/S
VAS RIGHT ICA MEAN VEL: 54.9 CM/S
VAS RIGHT ICA PSV: 77.5 CM/S
VAS RIGHT ICA PSV: 85.1 CM/S
VAS RIGHT LINDEGAARD RATIO: 1.4
VAS RIGHT LINDEGAARD RATIO: 1.6
VAS RIGHT MCA 1 EDV: 32.5 CM/S
VAS RIGHT MCA 1 EDV: 40.9 CM/S
VAS RIGHT MCA 1 MEAN VEL: 46.4 CM/S
VAS RIGHT MCA 1 MEAN VEL: 54.7 CM/S
VAS RIGHT MCA 1 PSV: 74.2 CM/S
VAS RIGHT MCA 1 PSV: 82.4 CM/S
VAS RIGHT PCA 1 EDV: 27.9 CM/S
VAS RIGHT PCA 1 EDV: 33.9 CM/S
VAS RIGHT PCA 1 MEAN VEL: 39.8 CM/S
VAS RIGHT PCA 1 MEAN VEL: 46 CM/S
VAS RIGHT PCA 1 PSV: 63.6 CM/S
VAS RIGHT PCA 1 PSV: 70.1 CM/S
VAS RIGHT VERTEBRAL EDV TCD: 12.8 CM/S
VAS RIGHT VERTEBRAL EDV TCD: 9.6 CM/S
VAS RIGHT VERTEBRAL MEAN VEL: 13.9 CM/S
VAS RIGHT VERTEBRAL MEAN VEL: 19.4 CM/S
VAS RIGHT VERTEBRAL PSV TCD: 22.5 CM/S
VAS RIGHT VERTEBRAL PSV TCD: 32.5 CM/S
WBC # BLD AUTO: 7 K/UL (ref 4.1–11.1)

## 2023-08-07 PROCEDURE — 2060000000 HC ICU INTERMEDIATE R&B

## 2023-08-07 PROCEDURE — 6370000000 HC RX 637 (ALT 250 FOR IP): Performed by: NURSE PRACTITIONER

## 2023-08-07 PROCEDURE — 99232 SBSQ HOSP IP/OBS MODERATE 35: CPT

## 2023-08-07 PROCEDURE — 2580000003 HC RX 258: Performed by: NURSE PRACTITIONER

## 2023-08-07 PROCEDURE — 85027 COMPLETE CBC AUTOMATED: CPT

## 2023-08-07 PROCEDURE — 93886 INTRACRANIAL COMPLETE STUDY: CPT

## 2023-08-07 PROCEDURE — 80048 BASIC METABOLIC PNL TOTAL CA: CPT

## 2023-08-07 PROCEDURE — 99233 SBSQ HOSP IP/OBS HIGH 50: CPT | Performed by: NURSE PRACTITIONER

## 2023-08-07 PROCEDURE — 83735 ASSAY OF MAGNESIUM: CPT

## 2023-08-07 PROCEDURE — 36415 COLL VENOUS BLD VENIPUNCTURE: CPT

## 2023-08-07 PROCEDURE — 6370000000 HC RX 637 (ALT 250 FOR IP)

## 2023-08-07 RX ORDER — SODIUM CHLORIDE, SODIUM LACTATE, POTASSIUM CHLORIDE, AND CALCIUM CHLORIDE .6; .31; .03; .02 G/100ML; G/100ML; G/100ML; G/100ML
500 INJECTION, SOLUTION INTRAVENOUS ONCE
Status: DISCONTINUED | OUTPATIENT
Start: 2023-08-07 | End: 2023-08-07

## 2023-08-07 RX ORDER — SODIUM CHLORIDE, SODIUM LACTATE, POTASSIUM CHLORIDE, CALCIUM CHLORIDE 600; 310; 30; 20 MG/100ML; MG/100ML; MG/100ML; MG/100ML
INJECTION, SOLUTION INTRAVENOUS CONTINUOUS
Status: DISCONTINUED | OUTPATIENT
Start: 2023-08-07 | End: 2023-08-08

## 2023-08-07 RX ORDER — SODIUM CHLORIDE, SODIUM LACTATE, POTASSIUM CHLORIDE, AND CALCIUM CHLORIDE .6; .31; .03; .02 G/100ML; G/100ML; G/100ML; G/100ML
500 INJECTION, SOLUTION INTRAVENOUS ONCE
Status: COMPLETED | OUTPATIENT
Start: 2023-08-07 | End: 2023-08-07

## 2023-08-07 RX ADMIN — BUTALBITAL, ACETAMINOPHEN, AND CAFFEINE 1 TABLET: 325; 50; 40 TABLET ORAL at 11:48

## 2023-08-07 RX ADMIN — NIMODIPINE 60 MG: 30 CAPSULE, LIQUID FILLED ORAL at 01:18

## 2023-08-07 RX ADMIN — ACETAMINOPHEN 650 MG: 325 TABLET ORAL at 17:48

## 2023-08-07 RX ADMIN — SODIUM CHLORIDE, POTASSIUM CHLORIDE, SODIUM LACTATE AND CALCIUM CHLORIDE 500 ML: 600; 310; 30; 20 INJECTION, SOLUTION INTRAVENOUS at 17:51

## 2023-08-07 RX ADMIN — NIMODIPINE 60 MG: 30 CAPSULE, LIQUID FILLED ORAL at 14:35

## 2023-08-07 RX ADMIN — NIMODIPINE 60 MG: 30 CAPSULE, LIQUID FILLED ORAL at 06:28

## 2023-08-07 RX ADMIN — SODIUM CHLORIDE, PRESERVATIVE FREE 10 ML: 5 INJECTION INTRAVENOUS at 09:00

## 2023-08-07 RX ADMIN — FAMOTIDINE 20 MG: 20 TABLET ORAL at 20:36

## 2023-08-07 RX ADMIN — ATORVASTATIN CALCIUM 40 MG: 40 TABLET, FILM COATED ORAL at 20:36

## 2023-08-07 RX ADMIN — BUTALBITAL, ACETAMINOPHEN, AND CAFFEINE 1 TABLET: 325; 50; 40 TABLET ORAL at 06:28

## 2023-08-07 RX ADMIN — LEVETIRACETAM 500 MG: 500 TABLET, FILM COATED ORAL at 20:36

## 2023-08-07 RX ADMIN — NIMODIPINE 60 MG: 30 CAPSULE, LIQUID FILLED ORAL at 22:03

## 2023-08-07 RX ADMIN — FAMOTIDINE 20 MG: 20 TABLET ORAL at 08:36

## 2023-08-07 RX ADMIN — ACETAMINOPHEN 650 MG: 325 TABLET ORAL at 08:43

## 2023-08-07 RX ADMIN — LEVETIRACETAM 500 MG: 500 TABLET, FILM COATED ORAL at 08:39

## 2023-08-07 RX ADMIN — NIMODIPINE 60 MG: 30 CAPSULE, LIQUID FILLED ORAL at 17:48

## 2023-08-07 RX ADMIN — SODIUM CHLORIDE, POTASSIUM CHLORIDE, SODIUM LACTATE AND CALCIUM CHLORIDE: 600; 310; 30; 20 INJECTION, SOLUTION INTRAVENOUS at 10:40

## 2023-08-07 RX ADMIN — NIMODIPINE 60 MG: 30 CAPSULE, LIQUID FILLED ORAL at 10:10

## 2023-08-07 RX ADMIN — SODIUM CHLORIDE, POTASSIUM CHLORIDE, SODIUM LACTATE AND CALCIUM CHLORIDE: 600; 310; 30; 20 INJECTION, SOLUTION INTRAVENOUS at 20:36

## 2023-08-07 ASSESSMENT — PAIN DESCRIPTION - DESCRIPTORS
DESCRIPTORS: ACHING

## 2023-08-07 ASSESSMENT — PAIN DESCRIPTION - ORIENTATION
ORIENTATION: ANTERIOR;POSTERIOR

## 2023-08-07 ASSESSMENT — PAIN DESCRIPTION - LOCATION
LOCATION: HEAD

## 2023-08-07 ASSESSMENT — PAIN SCALES - GENERAL
PAINLEVEL_OUTOF10: 1
PAINLEVEL_OUTOF10: 6
PAINLEVEL_OUTOF10: 1
PAINLEVEL_OUTOF10: 2

## 2023-08-07 NOTE — CARE COORDINATION
Care Management Initial Assessment       RUR: 5%  Readmission? No     08/07/23 1014   Service Assessment   Patient Orientation Alert and Oriented;Person;Place;Situation;Self   Cognition Alert   History Provided By Patient   Primary Caregiver Self   Support Systems Spouse/Significant Other  (Wife a[place]blo Jurado [phone])   [address] is: Legal Next of Kin  (Wife Pablo Jurado)   PCP Verified by CM Yes  (Dr Kathryn Blood)   Last Visit to PCP Within last year   Prior Functional Level Independent in ADLs/IADLs   Current Functional Level Independent in ADLs/IADLs   Can patient return to prior living arrangement Yes   Ability to make needs known: Good   Family able to assist with home care needs: Yes   Would you like for me to discuss the discharge plan with any other family members/significant others, and if so, who? 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 Head CT: [facility]  Patient admitted to ICU for close observation. Care manager met with patient to introduce self and explain role. Patient lives independently with his wife Pablo Jurado [phone]. He has no previous HH,DME or IPR needs. He confirmed his PCP to be Dr Kathryn Blood and sees him yearly and uses [facility] in [facility] as his pharmacy. CM confirmed demographic information. Per therapy notes, no needs have been identified.

## 2023-08-08 ENCOUNTER — APPOINTMENT (OUTPATIENT)
Facility: HOSPITAL | Age: 43
DRG: 065 | End: 2023-08-08
Payer: COMMERCIAL

## 2023-08-08 LAB
ANION GAP SERPL CALC-SCNC: 4 MMOL/L (ref 5–15)
BUN SERPL-MCNC: 11 MG/DL (ref 6–20)
BUN/CREAT SERPL: 14 (ref 12–20)
CALCIUM SERPL-MCNC: 8.9 MG/DL (ref 8.5–10.1)
CHLORIDE SERPL-SCNC: 111 MMOL/L (ref 97–108)
CO2 SERPL-SCNC: 23 MMOL/L (ref 21–32)
CREAT SERPL-MCNC: 0.81 MG/DL (ref 0.7–1.3)
ECHO BSA: 1.88 M2
ERYTHROCYTE [DISTWIDTH] IN BLOOD BY AUTOMATED COUNT: 11.6 % (ref 11.5–14.5)
GLUCOSE SERPL-MCNC: 90 MG/DL (ref 65–100)
HCT VFR BLD AUTO: 45.9 % (ref 36.6–50.3)
HGB BLD-MCNC: 15.4 G/DL (ref 12.1–17)
MAGNESIUM SERPL-MCNC: 2.6 MG/DL (ref 1.6–2.4)
MCH RBC QN AUTO: 29.2 PG (ref 26–34)
MCHC RBC AUTO-ENTMCNC: 33.6 G/DL (ref 30–36.5)
MCV RBC AUTO: 86.9 FL (ref 80–99)
NRBC # BLD: 0 K/UL (ref 0–0.01)
NRBC BLD-RTO: 0 PER 100 WBC
PLATELET # BLD AUTO: 223 K/UL (ref 150–400)
PMV BLD AUTO: 9.6 FL (ref 8.9–12.9)
POTASSIUM SERPL-SCNC: 4.2 MMOL/L (ref 3.5–5.1)
RBC # BLD AUTO: 5.28 M/UL (ref 4.1–5.7)
SODIUM SERPL-SCNC: 138 MMOL/L (ref 136–145)
VAS BASILAR ARTERY EDV: 29.3 CM/S
VAS BASILAR ARTERY MEAN VEL: 39 CM/S
VAS BASILAR ARTERY PSV: 59.3 CM/S
VAS LEFT ACA EDV: 38.7 CM/S
VAS LEFT ACA MEAN VEL: 51.9 CM/S
VAS LEFT ACA PSV: 78.2 CM/S
VAS LEFT EX ICA MEAN VEL: 40 CM/S
VAS LEFT ICA DIST EDV: 29.5 CM/S
VAS LEFT ICA DIST PSV: 60.5 CM/S
VAS LEFT ICA EDV: 34.3 CM/S
VAS LEFT ICA MEAN VEL: 44.2 CM/S
VAS LEFT ICA PSV: 64 CM/S
VAS LEFT LINDEGAARD RATIO: 1.4
VAS LEFT MCA 1 EDV: 39.8 CM/S
VAS LEFT MCA 1 MEAN VEL: 55.2 CM/S
VAS LEFT MCA 1 PSV: 85.9 CM/S
VAS LEFT PCA 1 EDV: 29.5 CM/S
VAS LEFT PCA 1 MEAN VEL: 38.9 CM/S
VAS LEFT PCA 1 PSV: 57.8 CM/S
VAS LEFT VERTEBRAL EDV TCD: 15.2 CM/S
VAS LEFT VERTEBRAL MEAN VEL: 21.4 CM/S
VAS LEFT VERTEBRAL PSV TCD: 33.9 CM/S
VAS RIGHT ACA EDV: 37.1 CM/S
VAS RIGHT ACA MEAN VEL: 52.6 CM/S
VAS RIGHT ACA PSV: 83.7 CM/S
VAS RIGHT EX ICA MEAN VEL: 36 CM/S
VAS RIGHT ICA DIST EDV: 24.9 CM/S
VAS RIGHT ICA DIST PSV: 58.7 CM/S
VAS RIGHT ICA EDV: 42.6 CM/S
VAS RIGHT ICA MEAN VEL: 57.3 CM/S
VAS RIGHT ICA PSV: 86.6 CM/S
VAS RIGHT LINDEGAARD RATIO: 1.5
VAS RIGHT MCA 1 EDV: 38.2 CM/S
VAS RIGHT MCA 1 MEAN VEL: 53.3 CM/S
VAS RIGHT MCA 1 PSV: 83.4 CM/S
VAS RIGHT PCA 1 EDV: 30.3 CM/S
VAS RIGHT PCA 1 MEAN VEL: 41.3 CM/S
VAS RIGHT PCA 1 PSV: 63.2 CM/S
VAS RIGHT VERTEBRAL EDV TCD: 13 CM/S
VAS RIGHT VERTEBRAL MEAN VEL: 19.8 CM/S
VAS RIGHT VERTEBRAL PSV TCD: 33.3 CM/S
WBC # BLD AUTO: 8.5 K/UL (ref 4.1–11.1)

## 2023-08-08 PROCEDURE — 6370000000 HC RX 637 (ALT 250 FOR IP): Performed by: NURSE PRACTITIONER

## 2023-08-08 PROCEDURE — 6370000000 HC RX 637 (ALT 250 FOR IP)

## 2023-08-08 PROCEDURE — 2060000000 HC ICU INTERMEDIATE R&B

## 2023-08-08 PROCEDURE — 36415 COLL VENOUS BLD VENIPUNCTURE: CPT

## 2023-08-08 PROCEDURE — 93886 INTRACRANIAL COMPLETE STUDY: CPT

## 2023-08-08 PROCEDURE — 85027 COMPLETE CBC AUTOMATED: CPT

## 2023-08-08 PROCEDURE — 80048 BASIC METABOLIC PNL TOTAL CA: CPT

## 2023-08-08 PROCEDURE — 99232 SBSQ HOSP IP/OBS MODERATE 35: CPT | Performed by: NURSE PRACTITIONER

## 2023-08-08 PROCEDURE — 83735 ASSAY OF MAGNESIUM: CPT

## 2023-08-08 PROCEDURE — 99232 SBSQ HOSP IP/OBS MODERATE 35: CPT

## 2023-08-08 PROCEDURE — 2580000003 HC RX 258: Performed by: NURSE PRACTITIONER

## 2023-08-08 RX ADMIN — SODIUM CHLORIDE, POTASSIUM CHLORIDE, SODIUM LACTATE AND CALCIUM CHLORIDE: 600; 310; 30; 20 INJECTION, SOLUTION INTRAVENOUS at 09:45

## 2023-08-08 RX ADMIN — LEVETIRACETAM 500 MG: 500 TABLET, FILM COATED ORAL at 21:27

## 2023-08-08 RX ADMIN — NIMODIPINE 60 MG: 30 CAPSULE, LIQUID FILLED ORAL at 01:47

## 2023-08-08 RX ADMIN — NIMODIPINE 60 MG: 30 CAPSULE, LIQUID FILLED ORAL at 21:26

## 2023-08-08 RX ADMIN — NIMODIPINE 60 MG: 30 CAPSULE, LIQUID FILLED ORAL at 18:39

## 2023-08-08 RX ADMIN — BUTALBITAL, ACETAMINOPHEN, AND CAFFEINE 1 TABLET: 325; 50; 40 TABLET ORAL at 11:08

## 2023-08-08 RX ADMIN — BUTALBITAL, ACETAMINOPHEN, AND CAFFEINE 1 TABLET: 325; 50; 40 TABLET ORAL at 18:39

## 2023-08-08 RX ADMIN — LEVETIRACETAM 500 MG: 500 TABLET, FILM COATED ORAL at 09:43

## 2023-08-08 RX ADMIN — NIMODIPINE 60 MG: 30 CAPSULE, LIQUID FILLED ORAL at 13:47

## 2023-08-08 RX ADMIN — BUTALBITAL, ACETAMINOPHEN, AND CAFFEINE 1 TABLET: 325; 50; 40 TABLET ORAL at 03:43

## 2023-08-08 RX ADMIN — NIMODIPINE 60 MG: 30 CAPSULE, LIQUID FILLED ORAL at 05:55

## 2023-08-08 RX ADMIN — NIMODIPINE 60 MG: 30 CAPSULE, LIQUID FILLED ORAL at 09:43

## 2023-08-08 RX ADMIN — ATORVASTATIN CALCIUM 40 MG: 40 TABLET, FILM COATED ORAL at 21:27

## 2023-08-08 RX ADMIN — FAMOTIDINE 20 MG: 20 TABLET ORAL at 21:27

## 2023-08-08 RX ADMIN — FAMOTIDINE 20 MG: 20 TABLET ORAL at 09:43

## 2023-08-08 ASSESSMENT — PAIN DESCRIPTION - LOCATION
LOCATION: HEAD

## 2023-08-08 ASSESSMENT — PAIN DESCRIPTION - ORIENTATION: ORIENTATION: LEFT;RIGHT

## 2023-08-08 ASSESSMENT — PAIN SCALES - GENERAL
PAINLEVEL_OUTOF10: 5
PAINLEVEL_OUTOF10: 2
PAINLEVEL_OUTOF10: 7
PAINLEVEL_OUTOF10: 1
PAINLEVEL_OUTOF10: 1

## 2023-08-08 ASSESSMENT — PAIN - FUNCTIONAL ASSESSMENT: PAIN_FUNCTIONAL_ASSESSMENT: ACTIVITIES ARE NOT PREVENTED

## 2023-08-08 ASSESSMENT — PAIN DESCRIPTION - PAIN TYPE: TYPE: ACUTE PAIN

## 2023-08-08 NOTE — PLAN OF CARE
Problem: Discharge Planning  Goal: Discharge to home or other facility with appropriate resources  Outcome: Progressing  Flowsheets (Taken 8/8/2023 0800)  Discharge to home or other facility with appropriate resources: Identify barriers to discharge with patient and caregiver     Problem: Pain  Goal: Verbalizes/displays adequate comfort level or baseline comfort level  Outcome: Progressing     Problem: Safety - Adult  Goal: Free from fall injury  Outcome: Progressing  Flowsheets (Taken 8/8/2023 0800)  Free From Fall Injury: Instruct family/caregiver on patient safety

## 2023-08-09 ENCOUNTER — APPOINTMENT (OUTPATIENT)
Facility: HOSPITAL | Age: 43
DRG: 065 | End: 2023-08-09
Payer: COMMERCIAL

## 2023-08-09 DIAGNOSIS — I60.8: Primary | ICD-10-CM

## 2023-08-09 LAB
ANION GAP SERPL CALC-SCNC: 10 MMOL/L (ref 5–15)
BUN SERPL-MCNC: 11 MG/DL (ref 6–20)
BUN/CREAT SERPL: 13 (ref 12–20)
CALCIUM SERPL-MCNC: 8.9 MG/DL (ref 8.5–10.1)
CHLORIDE SERPL-SCNC: 110 MMOL/L (ref 97–108)
CO2 SERPL-SCNC: 23 MMOL/L (ref 21–32)
CREAT SERPL-MCNC: 0.82 MG/DL (ref 0.7–1.3)
ECHO BSA: 1.88 M2
ERYTHROCYTE [DISTWIDTH] IN BLOOD BY AUTOMATED COUNT: 11.6 % (ref 11.5–14.5)
GLUCOSE BLD STRIP.AUTO-MCNC: 87 MG/DL (ref 65–117)
GLUCOSE SERPL-MCNC: 91 MG/DL (ref 65–100)
HCT VFR BLD AUTO: 45.6 % (ref 36.6–50.3)
HGB BLD-MCNC: 15.6 G/DL (ref 12.1–17)
MAGNESIUM SERPL-MCNC: 2.2 MG/DL (ref 1.6–2.4)
MCH RBC QN AUTO: 29.5 PG (ref 26–34)
MCHC RBC AUTO-ENTMCNC: 34.2 G/DL (ref 30–36.5)
MCV RBC AUTO: 86.2 FL (ref 80–99)
NRBC # BLD: 0 K/UL (ref 0–0.01)
NRBC BLD-RTO: 0 PER 100 WBC
PLATELET # BLD AUTO: 225 K/UL (ref 150–400)
PMV BLD AUTO: 9.6 FL (ref 8.9–12.9)
POTASSIUM SERPL-SCNC: 4.1 MMOL/L (ref 3.5–5.1)
RBC # BLD AUTO: 5.29 M/UL (ref 4.1–5.7)
SERVICE CMNT-IMP: NORMAL
SODIUM SERPL-SCNC: 143 MMOL/L (ref 136–145)
VAS BASILAR ARTERY EDV: 18.2 CM/S
VAS BASILAR ARTERY MEAN VEL: 30 CM/S
VAS BASILAR ARTERY PSV: 53.8 CM/S
VAS LEFT ACA EDV: 33.6 CM/S
VAS LEFT ACA MEAN VEL: 47.1 CM/S
VAS LEFT ACA PSV: 74.2 CM/S
VAS LEFT EX ICA MEAN VEL: 39 CM/S
VAS LEFT ICA DIST EDV: 26.4 CM/S
VAS LEFT ICA DIST PSV: 65.7 CM/S
VAS LEFT ICA EDV: 24.9 CM/S
VAS LEFT ICA MEAN VEL: 42.3 CM/S
VAS LEFT ICA PSV: 77 CM/S
VAS LEFT LINDEGAARD RATIO: 1.4
VAS LEFT MCA 1 EDV: 34.7 CM/S
VAS LEFT MCA 1 MEAN VEL: 53 CM/S
VAS LEFT MCA 1 PSV: 89.6 CM/S
VAS LEFT PCA 1 EDV: 24.9 CM/S
VAS LEFT PCA 1 MEAN VEL: 35.9 CM/S
VAS LEFT PCA 1 PSV: 57.8 CM/S
VAS LEFT VERTEBRAL EDV TCD: 18.6 CM/S
VAS LEFT VERTEBRAL MEAN VEL: 28 CM/S
VAS LEFT VERTEBRAL PSV TCD: 46.9 CM/S
VAS RIGHT ACA EDV: 35.7 CM/S
VAS RIGHT ACA MEAN VEL: 51.3 CM/S
VAS RIGHT ACA PSV: 82.4 CM/S
VAS RIGHT EX ICA MEAN VEL: 27 CM/S
VAS RIGHT ICA DIST EDV: 17.3 CM/S
VAS RIGHT ICA DIST PSV: 48.3 CM/S
VAS RIGHT ICA EDV: 37.6 CM/S
VAS RIGHT ICA MEAN VEL: 54.8 CM/S
VAS RIGHT ICA PSV: 89.2 CM/S
VAS RIGHT LINDEGAARD RATIO: 2.1
VAS RIGHT MCA 1 EDV: 39.6 CM/S
VAS RIGHT MCA 1 MEAN VEL: 57.8 CM/S
VAS RIGHT MCA 1 PSV: 94.1 CM/S
VAS RIGHT PCA 1 EDV: 31.1 CM/S
VAS RIGHT PCA 1 MEAN VEL: 44.6 CM/S
VAS RIGHT PCA 1 PSV: 71.7 CM/S
VAS RIGHT VERTEBRAL EDV TCD: 11 CM/S
VAS RIGHT VERTEBRAL MEAN VEL: 16.7 CM/S
VAS RIGHT VERTEBRAL PSV TCD: 28.2 CM/S
WBC # BLD AUTO: 8.1 K/UL (ref 4.1–11.1)

## 2023-08-09 PROCEDURE — 6370000000 HC RX 637 (ALT 250 FOR IP): Performed by: NURSE PRACTITIONER

## 2023-08-09 PROCEDURE — 85027 COMPLETE CBC AUTOMATED: CPT

## 2023-08-09 PROCEDURE — 99232 SBSQ HOSP IP/OBS MODERATE 35: CPT | Performed by: NURSE PRACTITIONER

## 2023-08-09 PROCEDURE — 82962 GLUCOSE BLOOD TEST: CPT

## 2023-08-09 PROCEDURE — 2060000000 HC ICU INTERMEDIATE R&B

## 2023-08-09 PROCEDURE — 2580000003 HC RX 258: Performed by: NURSE PRACTITIONER

## 2023-08-09 PROCEDURE — 36415 COLL VENOUS BLD VENIPUNCTURE: CPT

## 2023-08-09 PROCEDURE — 6370000000 HC RX 637 (ALT 250 FOR IP): Performed by: HOSPITALIST

## 2023-08-09 PROCEDURE — 93886 INTRACRANIAL COMPLETE STUDY: CPT

## 2023-08-09 PROCEDURE — 6370000000 HC RX 637 (ALT 250 FOR IP)

## 2023-08-09 PROCEDURE — 83735 ASSAY OF MAGNESIUM: CPT

## 2023-08-09 PROCEDURE — 80048 BASIC METABOLIC PNL TOTAL CA: CPT

## 2023-08-09 RX ORDER — CETIRIZINE HYDROCHLORIDE 10 MG/1
10 TABLET ORAL DAILY
Status: DISCONTINUED | OUTPATIENT
Start: 2023-08-09 | End: 2023-08-11 | Stop reason: HOSPADM

## 2023-08-09 RX ORDER — SODIUM CHLORIDE, SODIUM LACTATE, POTASSIUM CHLORIDE, CALCIUM CHLORIDE 600; 310; 30; 20 MG/100ML; MG/100ML; MG/100ML; MG/100ML
INJECTION, SOLUTION INTRAVENOUS CONTINUOUS
Status: DISCONTINUED | OUTPATIENT
Start: 2023-08-09 | End: 2023-08-11

## 2023-08-09 RX ADMIN — ATORVASTATIN CALCIUM 40 MG: 40 TABLET, FILM COATED ORAL at 21:56

## 2023-08-09 RX ADMIN — NIMODIPINE 60 MG: 30 CAPSULE, LIQUID FILLED ORAL at 21:56

## 2023-08-09 RX ADMIN — BUTALBITAL, ACETAMINOPHEN, AND CAFFEINE 1 TABLET: 325; 50; 40 TABLET ORAL at 19:19

## 2023-08-09 RX ADMIN — LEVETIRACETAM 500 MG: 500 TABLET, FILM COATED ORAL at 21:56

## 2023-08-09 RX ADMIN — ACETAMINOPHEN 650 MG: 325 TABLET ORAL at 21:56

## 2023-08-09 RX ADMIN — SODIUM CHLORIDE, PRESERVATIVE FREE 10 ML: 5 INJECTION INTRAVENOUS at 09:37

## 2023-08-09 RX ADMIN — BUTALBITAL, ACETAMINOPHEN, AND CAFFEINE 1 TABLET: 325; 50; 40 TABLET ORAL at 06:34

## 2023-08-09 RX ADMIN — NIMODIPINE 60 MG: 30 CAPSULE, LIQUID FILLED ORAL at 06:34

## 2023-08-09 RX ADMIN — BUTALBITAL, ACETAMINOPHEN, AND CAFFEINE 1 TABLET: 325; 50; 40 TABLET ORAL at 13:25

## 2023-08-09 RX ADMIN — LEVETIRACETAM 500 MG: 500 TABLET, FILM COATED ORAL at 09:31

## 2023-08-09 RX ADMIN — NIMODIPINE 60 MG: 30 CAPSULE, LIQUID FILLED ORAL at 09:32

## 2023-08-09 RX ADMIN — FAMOTIDINE 20 MG: 20 TABLET ORAL at 09:31

## 2023-08-09 RX ADMIN — FAMOTIDINE 20 MG: 20 TABLET ORAL at 21:56

## 2023-08-09 RX ADMIN — NIMODIPINE 60 MG: 30 CAPSULE, LIQUID FILLED ORAL at 18:12

## 2023-08-09 RX ADMIN — CETIRIZINE HYDROCHLORIDE 10 MG: 10 TABLET ORAL at 11:21

## 2023-08-09 RX ADMIN — NIMODIPINE 60 MG: 30 CAPSULE, LIQUID FILLED ORAL at 01:54

## 2023-08-09 RX ADMIN — ACETAMINOPHEN 650 MG: 325 TABLET ORAL at 15:43

## 2023-08-09 RX ADMIN — NIMODIPINE 60 MG: 30 CAPSULE, LIQUID FILLED ORAL at 13:51

## 2023-08-09 RX ADMIN — ACETAMINOPHEN 650 MG: 325 TABLET ORAL at 09:31

## 2023-08-09 ASSESSMENT — PAIN DESCRIPTION - DESCRIPTORS: DESCRIPTORS: ACHING

## 2023-08-09 ASSESSMENT — PAIN DESCRIPTION - LOCATION
LOCATION: HEAD

## 2023-08-09 ASSESSMENT — PAIN SCALES - GENERAL
PAINLEVEL_OUTOF10: 2
PAINLEVEL_OUTOF10: 1
PAINLEVEL_OUTOF10: 1
PAINLEVEL_OUTOF10: 2
PAINLEVEL_OUTOF10: 1
PAINLEVEL_OUTOF10: 1

## 2023-08-09 ASSESSMENT — PAIN DESCRIPTION - ORIENTATION: ORIENTATION: LEFT;RIGHT

## 2023-08-09 ASSESSMENT — PAIN - FUNCTIONAL ASSESSMENT: PAIN_FUNCTIONAL_ASSESSMENT: ACTIVITIES ARE NOT PREVENTED

## 2023-08-09 NOTE — PLAN OF CARE
Problem: Discharge Planning  Goal: Discharge to home or other facility with appropriate resources  Outcome: Progressing  Flowsheets (Taken 8/9/2023 0800)  Discharge to home or other facility with appropriate resources: Identify barriers to discharge with patient and caregiver     Problem: Pain  Goal: Verbalizes/displays adequate comfort level or baseline comfort level  Outcome: Progressing     Problem: Safety - Adult  Goal: Free from fall injury  Outcome: Progressing  Flowsheets (Taken 8/9/2023 0800)  Free From Fall Injury: Instruct family/caregiver on patient safety     Problem: Chronic Conditions and Co-morbidities  Goal: Patient's chronic conditions and co-morbidity symptoms are monitored and maintained or improved  Outcome: Progressing  Flowsheets (Taken 8/9/2023 0800)  Care Plan - Patient's Chronic Conditions and Co-Morbidity Symptoms are Monitored and Maintained or Improved: Monitor and assess patient's chronic conditions and comorbid symptoms for stability, deterioration, or improvement

## 2023-08-10 ENCOUNTER — APPOINTMENT (OUTPATIENT)
Facility: HOSPITAL | Age: 43
DRG: 065 | End: 2023-08-10
Payer: COMMERCIAL

## 2023-08-10 ENCOUNTER — HOSPITAL ENCOUNTER (INPATIENT)
Facility: HOSPITAL | Age: 43
DRG: 065 | End: 2023-08-10
Payer: COMMERCIAL

## 2023-08-10 VITALS
OXYGEN SATURATION: 98 % | RESPIRATION RATE: 12 BRPM | DIASTOLIC BLOOD PRESSURE: 89 MMHG | TEMPERATURE: 98.1 F | SYSTOLIC BLOOD PRESSURE: 141 MMHG | HEART RATE: 74 BPM

## 2023-08-10 LAB
ANION GAP SERPL CALC-SCNC: 6 MMOL/L (ref 5–15)
BUN SERPL-MCNC: 15 MG/DL (ref 6–20)
BUN/CREAT SERPL: 17 (ref 12–20)
CALCIUM SERPL-MCNC: 8.8 MG/DL (ref 8.5–10.1)
CHLORIDE SERPL-SCNC: 109 MMOL/L (ref 97–108)
CO2 SERPL-SCNC: 25 MMOL/L (ref 21–32)
CREAT SERPL-MCNC: 0.89 MG/DL (ref 0.7–1.3)
ECHO BSA: 1.88 M2
ERYTHROCYTE [DISTWIDTH] IN BLOOD BY AUTOMATED COUNT: 11.6 % (ref 11.5–14.5)
GLUCOSE SERPL-MCNC: 105 MG/DL (ref 65–100)
HCT VFR BLD AUTO: 45.6 % (ref 36.6–50.3)
HGB BLD-MCNC: 15.3 G/DL (ref 12.1–17)
MAGNESIUM SERPL-MCNC: 2.3 MG/DL (ref 1.6–2.4)
MCH RBC QN AUTO: 29.3 PG (ref 26–34)
MCHC RBC AUTO-ENTMCNC: 33.6 G/DL (ref 30–36.5)
MCV RBC AUTO: 87.2 FL (ref 80–99)
NRBC # BLD: 0 K/UL (ref 0–0.01)
NRBC BLD-RTO: 0 PER 100 WBC
PLATELET # BLD AUTO: 232 K/UL (ref 150–400)
PMV BLD AUTO: 9.7 FL (ref 8.9–12.9)
POTASSIUM SERPL-SCNC: 3.4 MMOL/L (ref 3.5–5.1)
RBC # BLD AUTO: 5.23 M/UL (ref 4.1–5.7)
SODIUM SERPL-SCNC: 140 MMOL/L (ref 136–145)
VAS BASILAR ARTERY EDV: 26.7 CM/S
VAS BASILAR ARTERY MEAN VEL: 37 CM/S
VAS BASILAR ARTERY PSV: 56.4 CM/S
VAS LEFT ACA EDV: 37.7 CM/S
VAS LEFT ACA MEAN VEL: 49 CM/S
VAS LEFT ACA PSV: 71.5 CM/S
VAS LEFT EX ICA MEAN VEL: 32 CM/S
VAS LEFT ICA DIST EDV: 22.2 CM/S
VAS LEFT ICA DIST PSV: 52.3 CM/S
VAS LEFT ICA EDV: 27.9 CM/S
VAS LEFT ICA MEAN VEL: 41 CM/S
VAS LEFT ICA PSV: 67.2 CM/S
VAS LEFT LINDEGAARD RATIO: 1.5
VAS LEFT MCA 1 EDV: 36.2 CM/S
VAS LEFT MCA 1 MEAN VEL: 49.3 CM/S
VAS LEFT MCA 1 PSV: 75.5 CM/S
VAS LEFT PCA 1 EDV: 22.5 CM/S
VAS LEFT PCA 1 MEAN VEL: 30.7 CM/S
VAS LEFT PCA 1 PSV: 47.1 CM/S
VAS LEFT VERTEBRAL EDV TCD: 17.6 CM/S
VAS LEFT VERTEBRAL MEAN VEL: 24.4 CM/S
VAS LEFT VERTEBRAL PSV TCD: 37.9 CM/S
VAS RIGHT ACA EDV: 38.7 CM/S
VAS RIGHT ACA MEAN VEL: 51.9 CM/S
VAS RIGHT ACA PSV: 78.3 CM/S
VAS RIGHT EX ICA MEAN VEL: 31 CM/S
VAS RIGHT ICA DIST EDV: 21.3 CM/S
VAS RIGHT ICA DIST PSV: 50.5 CM/S
VAS RIGHT ICA EDV: 35.8 CM/S
VAS RIGHT ICA MEAN VEL: 49.3 CM/S
VAS RIGHT ICA PSV: 76.4 CM/S
VAS RIGHT LINDEGAARD RATIO: 1.5
VAS RIGHT MCA 1 EDV: 34.5 CM/S
VAS RIGHT MCA 1 MEAN VEL: 47.4 CM/S
VAS RIGHT MCA 1 PSV: 73.3 CM/S
VAS RIGHT PCA 1 EDV: 26.1 CM/S
VAS RIGHT PCA 1 MEAN VEL: 34.6 CM/S
VAS RIGHT PCA 1 PSV: 51.7 CM/S
VAS RIGHT VERTEBRAL EDV TCD: 9.7 CM/S
VAS RIGHT VERTEBRAL MEAN VEL: 13.9 CM/S
VAS RIGHT VERTEBRAL PSV TCD: 22.3 CM/S
WBC # BLD AUTO: 8.2 K/UL (ref 4.1–11.1)

## 2023-08-10 PROCEDURE — 36415 COLL VENOUS BLD VENIPUNCTURE: CPT

## 2023-08-10 PROCEDURE — 6360000002 HC RX W HCPCS

## 2023-08-10 PROCEDURE — 93886 INTRACRANIAL COMPLETE STUDY: CPT

## 2023-08-10 PROCEDURE — B3151ZZ FLUOROSCOPY OF BILATERAL COMMON CAROTID ARTERIES USING LOW OSMOLAR CONTRAST: ICD-10-PCS | Performed by: RADIOLOGY

## 2023-08-10 PROCEDURE — 2500000003 HC RX 250 WO HCPCS: Performed by: RADIOLOGY

## 2023-08-10 PROCEDURE — 6370000000 HC RX 637 (ALT 250 FOR IP): Performed by: NURSE PRACTITIONER

## 2023-08-10 PROCEDURE — 6370000000 HC RX 637 (ALT 250 FOR IP)

## 2023-08-10 PROCEDURE — 6360000002 HC RX W HCPCS: Performed by: RADIOLOGY

## 2023-08-10 PROCEDURE — 85027 COMPLETE CBC AUTOMATED: CPT

## 2023-08-10 PROCEDURE — 80048 BASIC METABOLIC PNL TOTAL CA: CPT

## 2023-08-10 PROCEDURE — 99152 MOD SED SAME PHYS/QHP 5/>YRS: CPT

## 2023-08-10 PROCEDURE — B31G1ZZ FLUOROSCOPY OF BILATERAL VERTEBRAL ARTERIES USING LOW OSMOLAR CONTRAST: ICD-10-PCS | Performed by: RADIOLOGY

## 2023-08-10 PROCEDURE — 2580000003 HC RX 258

## 2023-08-10 PROCEDURE — 2060000000 HC ICU INTERMEDIATE R&B

## 2023-08-10 PROCEDURE — 2580000003 HC RX 258: Performed by: NURSE PRACTITIONER

## 2023-08-10 PROCEDURE — B31C1ZZ FLUOROSCOPY OF BILATERAL EXTERNAL CAROTID ARTERIES USING LOW OSMOLAR CONTRAST: ICD-10-PCS | Performed by: RADIOLOGY

## 2023-08-10 PROCEDURE — 2580000003 HC RX 258: Performed by: RADIOLOGY

## 2023-08-10 PROCEDURE — 83735 ASSAY OF MAGNESIUM: CPT

## 2023-08-10 PROCEDURE — 6370000000 HC RX 637 (ALT 250 FOR IP): Performed by: HOSPITALIST

## 2023-08-10 RX ORDER — POTASSIUM CHLORIDE 750 MG/1
40 TABLET, FILM COATED, EXTENDED RELEASE ORAL ONCE
Status: COMPLETED | OUTPATIENT
Start: 2023-08-10 | End: 2023-08-10

## 2023-08-10 RX ORDER — SODIUM BICARBONATE 42 MG/ML
INJECTION, SOLUTION INTRAVENOUS PRN
Status: COMPLETED | OUTPATIENT
Start: 2023-08-10 | End: 2023-08-10

## 2023-08-10 RX ORDER — SODIUM CHLORIDE 0.9 % (FLUSH) 0.9 %
5-40 SYRINGE (ML) INJECTION PRN
Status: DISCONTINUED | OUTPATIENT
Start: 2023-08-10 | End: 2023-08-11 | Stop reason: HOSPADM

## 2023-08-10 RX ORDER — METOCLOPRAMIDE HYDROCHLORIDE 5 MG/ML
10 INJECTION INTRAMUSCULAR; INTRAVENOUS EVERY 6 HOURS
Status: DISCONTINUED | OUTPATIENT
Start: 2023-08-10 | End: 2023-08-11

## 2023-08-10 RX ORDER — VERAPAMIL HYDROCHLORIDE 2.5 MG/ML
INJECTION, SOLUTION INTRAVENOUS PRN
Status: COMPLETED | OUTPATIENT
Start: 2023-08-10 | End: 2023-08-10

## 2023-08-10 RX ORDER — LIDOCAINE HYDROCHLORIDE 10 MG/ML
INJECTION, SOLUTION EPIDURAL; INFILTRATION; INTRACAUDAL; PERINEURAL PRN
Status: COMPLETED | OUTPATIENT
Start: 2023-08-10 | End: 2023-08-10

## 2023-08-10 RX ORDER — MIDAZOLAM HYDROCHLORIDE 2 MG/2ML
INJECTION, SOLUTION INTRAMUSCULAR; INTRAVENOUS PRN
Status: COMPLETED | OUTPATIENT
Start: 2023-08-10 | End: 2023-08-10

## 2023-08-10 RX ORDER — HEPARIN SODIUM 1000 [USP'U]/ML
INJECTION, SOLUTION INTRAVENOUS; SUBCUTANEOUS PRN
Status: COMPLETED | OUTPATIENT
Start: 2023-08-10 | End: 2023-08-10

## 2023-08-10 RX ORDER — FENTANYL CITRATE 50 UG/ML
INJECTION, SOLUTION INTRAMUSCULAR; INTRAVENOUS PRN
Status: COMPLETED | OUTPATIENT
Start: 2023-08-10 | End: 2023-08-10

## 2023-08-10 RX ADMIN — NIMODIPINE 60 MG: 30 CAPSULE, LIQUID FILLED ORAL at 05:49

## 2023-08-10 RX ADMIN — Medication 100 MCG: at 10:03

## 2023-08-10 RX ADMIN — POTASSIUM CHLORIDE 40 MEQ: 750 TABLET, FILM COATED, EXTENDED RELEASE ORAL at 12:24

## 2023-08-10 RX ADMIN — FAMOTIDINE 20 MG: 20 TABLET ORAL at 21:13

## 2023-08-10 RX ADMIN — ATORVASTATIN CALCIUM 40 MG: 40 TABLET, FILM COATED ORAL at 21:12

## 2023-08-10 RX ADMIN — LIDOCAINE HYDROCHLORIDE 1 ML: 10 INJECTION, SOLUTION EPIDURAL; INFILTRATION; INTRACAUDAL; PERINEURAL at 10:00

## 2023-08-10 RX ADMIN — SODIUM BICARBONATE 0.5 MEQ: 42 INJECTION, SOLUTION INTRAVENOUS at 10:03

## 2023-08-10 RX ADMIN — SODIUM CHLORIDE, POTASSIUM CHLORIDE, SODIUM LACTATE AND CALCIUM CHLORIDE: 600; 310; 30; 20 INJECTION, SOLUTION INTRAVENOUS at 17:40

## 2023-08-10 RX ADMIN — CETIRIZINE HYDROCHLORIDE 10 MG: 10 TABLET ORAL at 11:20

## 2023-08-10 RX ADMIN — ACETAMINOPHEN 650 MG: 325 TABLET ORAL at 18:06

## 2023-08-10 RX ADMIN — SODIUM CHLORIDE, POTASSIUM CHLORIDE, SODIUM LACTATE AND CALCIUM CHLORIDE: 600; 310; 30; 20 INJECTION, SOLUTION INTRAVENOUS at 02:23

## 2023-08-10 RX ADMIN — SODIUM CHLORIDE, PRESERVATIVE FREE 10 ML: 5 INJECTION INTRAVENOUS at 11:20

## 2023-08-10 RX ADMIN — MIDAZOLAM HYDROCHLORIDE 1 MG: 1 INJECTION, SOLUTION INTRAMUSCULAR; INTRAVENOUS at 09:57

## 2023-08-10 RX ADMIN — NIMODIPINE 60 MG: 30 CAPSULE, LIQUID FILLED ORAL at 14:36

## 2023-08-10 RX ADMIN — METOCLOPRAMIDE HYDROCHLORIDE 10 MG: 5 INJECTION INTRAMUSCULAR; INTRAVENOUS at 21:12

## 2023-08-10 RX ADMIN — FAMOTIDINE 20 MG: 20 TABLET ORAL at 11:20

## 2023-08-10 RX ADMIN — NIMODIPINE 60 MG: 30 CAPSULE, LIQUID FILLED ORAL at 09:07

## 2023-08-10 RX ADMIN — LEVETIRACETAM 500 MG: 500 TABLET, FILM COATED ORAL at 09:07

## 2023-08-10 RX ADMIN — BUTALBITAL, ACETAMINOPHEN, AND CAFFEINE 1 TABLET: 325; 50; 40 TABLET ORAL at 02:23

## 2023-08-10 RX ADMIN — ACETAMINOPHEN 650 MG: 325 TABLET ORAL at 11:19

## 2023-08-10 RX ADMIN — NIMODIPINE 60 MG: 30 CAPSULE, LIQUID FILLED ORAL at 18:05

## 2023-08-10 RX ADMIN — BUTALBITAL, ACETAMINOPHEN, AND CAFFEINE 1 TABLET: 325; 50; 40 TABLET ORAL at 09:07

## 2023-08-10 RX ADMIN — FENTANYL CITRATE 50 MCG: 50 INJECTION, SOLUTION INTRAMUSCULAR; INTRAVENOUS at 09:57

## 2023-08-10 RX ADMIN — HEPARIN SODIUM: 1000 INJECTION INTRAVENOUS; SUBCUTANEOUS at 10:06

## 2023-08-10 RX ADMIN — VERAPAMIL HYDROCHLORIDE 2.5 MG: 2.5 INJECTION, SOLUTION INTRAVENOUS at 10:03

## 2023-08-10 RX ADMIN — NIMODIPINE 60 MG: 30 CAPSULE, LIQUID FILLED ORAL at 02:23

## 2023-08-10 RX ADMIN — NIMODIPINE 60 MG: 30 CAPSULE, LIQUID FILLED ORAL at 21:12

## 2023-08-10 RX ADMIN — ACETAMINOPHEN 650 MG: 325 TABLET ORAL at 05:55

## 2023-08-10 RX ADMIN — HEPARIN SODIUM 2000 UNITS: 1000 INJECTION INTRAVENOUS; SUBCUTANEOUS at 10:03

## 2023-08-10 RX ADMIN — BUTALBITAL, ACETAMINOPHEN, AND CAFFEINE 1 TABLET: 325; 50; 40 TABLET ORAL at 16:20

## 2023-08-10 RX ADMIN — SODIUM CHLORIDE, PRESERVATIVE FREE 10 ML: 5 INJECTION INTRAVENOUS at 21:13

## 2023-08-10 ASSESSMENT — PULMONARY FUNCTION TESTS
PIF_VALUE: 0
PIF_VALUE: 1
PIF_VALUE: 0

## 2023-08-10 ASSESSMENT — PAIN SCALES - GENERAL
PAINLEVEL_OUTOF10: 0
PAINLEVEL_OUTOF10: 2
PAINLEVEL_OUTOF10: 0
PAINLEVEL_OUTOF10: 2
PAINLEVEL_OUTOF10: 1
PAINLEVEL_OUTOF10: 0
PAINLEVEL_OUTOF10: 2
PAINLEVEL_OUTOF10: 0
PAINLEVEL_OUTOF10: 0
PAINLEVEL_OUTOF10: 1
PAINLEVEL_OUTOF10: 0
PAINLEVEL_OUTOF10: 2
PAINLEVEL_OUTOF10: 0

## 2023-08-10 ASSESSMENT — PAIN DESCRIPTION - LOCATION: LOCATION: HEAD

## 2023-08-10 ASSESSMENT — PAIN DESCRIPTION - DESCRIPTORS: DESCRIPTORS: ACHING

## 2023-08-10 ASSESSMENT — PAIN - FUNCTIONAL ASSESSMENT: PAIN_FUNCTIONAL_ASSESSMENT: ACTIVITIES ARE NOT PREVENTED

## 2023-08-10 ASSESSMENT — PAIN DESCRIPTION - ORIENTATION: ORIENTATION: LEFT;RIGHT

## 2023-08-10 NOTE — PLAN OF CARE
Problem: Discharge Planning  Goal: Discharge to home or other facility with appropriate resources  Outcome: Progressing  Flowsheets (Taken 8/10/2023 0800)  Discharge to home or other facility with appropriate resources: Identify barriers to discharge with patient and caregiver     Problem: Pain  Goal: Verbalizes/displays adequate comfort level or baseline comfort level  Outcome: Progressing     Problem: Safety - Adult  Goal: Free from fall injury  Outcome: Progressing  Flowsheets (Taken 8/10/2023 0800)  Free From Fall Injury: Instruct family/caregiver on patient safety     Problem: Chronic Conditions and Co-morbidities  Goal: Patient's chronic conditions and co-morbidity symptoms are monitored and maintained or improved  Outcome: Progressing  Flowsheets (Taken 8/10/2023 0800)  Care Plan - Patient's Chronic Conditions and Co-Morbidity Symptoms are Monitored and Maintained or Improved: Monitor and assess patient's chronic conditions and comorbid symptoms for stability, deterioration, or improvement

## 2023-08-10 NOTE — PROGRESS NOTES
TRANSFER - IN REPORT:    Verbal report received from Mary Ellen Stinson RN on Jalyn Howard  being received from Neuro for ordered procedure      Report consisted of patient's Situation, Background, Assessment and   Recommendations(SBAR). Information from the following report(s) Nurse Handoff Report was reviewed with the receiving nurse. Opportunity for questions and clarification was provided. Assessment completed upon patient's arrival to unit and care assumed.

## 2023-08-10 NOTE — BRIEF OP NOTE
Neuro-Interventional Surgery - Brief procedure note      Patient: Juan Vargas MRN: [id number]     YOB: 1980  Age: 37 y.o. Sex: male      Service: Neuro-Interventional Surgery    Date of Procedure: 8/10/2023    Pre-Procedure Diagnosis: Subarachnoid hemorrhage    Post-Procedure Diagnosis: SAME    Procedure(s): Catheter cerebral/cervical arteriogram    Vessels selected: Right vertebral artery, Right common carotid artery, Right internal carotid artery, Left common carotid artery, Left internal carotid artery, Left external carotid artery, and Left vertebral artery    Brief Description of Procedure: No aneurysm, AVM or AV fistula seen. No vasospasm identified. Right radial artery access hemostasis using Vasc-band without complications. Anesthesia:Moderate Sedation    Estimated Blood Loss:  10 ml. Specimens:  None    Implants:  None    Apparent Intraoperative Complications:  None immediate    Patient Condition:  Stable    Disposition:  Inpatient unit    Attestation:  I performed the procedure.         Signed By: Kimberlee Padilla MD     August 10, 2023     [facility] NEUROINTERVENTIONAL SURGERY

## 2023-08-10 NOTE — PROGRESS NOTES
TRANSFER - OUT REPORT:    Verbal report given to Ava Lim RN on Bill Thomas  being transferred to [facility] for routine progression of patient care       Report consisted of patient's Situation, Background, Assessment and   Recommendations(SBAR). Information from the following report(s) Nurse Handoff Report, MAR, Neuro Assessment, and Event Log was reviewed with the receiving nurse. Lines:   Peripheral IV 08/03/23 Distal;Left Cephalic (Active)   Site Assessment Clean, dry & intact 08/10/23 0400   Line Status Flushed;Capped 08/10/23 0400   Line Care Connections checked and tightened 08/10/23 0400   Phlebitis Assessment No symptoms 08/10/23 0400   Infiltration Assessment 0 08/10/23 0400   Alcohol Cap Used Yes 08/10/23 0400   Dressing Status Clean, dry & intact 08/10/23 0400   Dressing Type Transparent 08/10/23 0400   Dressing Intervention New 08/08/23 0400       Peripheral IV 08/03/23 Right Antecubital (Active)   Site Assessment Clean, dry & intact 08/10/23 0400   Line Status Capped;Flushed 08/10/23 0400   Line Care Connections checked and tightened 08/10/23 0400   Phlebitis Assessment No symptoms 08/10/23 0400   Infiltration Assessment 0 08/10/23 0400   Alcohol Cap Used Yes 08/10/23 0400   Dressing Status Clean, dry & intact 08/10/23 0400   Dressing Type Transparent 08/10/23 0400   Dressing Intervention New 08/08/23 0400        Opportunity for questions and clarification was provided.       Patient transported with:  Monitor and Registered Nurse

## 2023-08-10 NOTE — PRE SEDATION
Sedation Pre-Procedure Note    Patient Name: Chad Bay   YOB: 1980  Room/Bed: [facility]  Medical Record Number: [id number]  Date: 8/10/2023   Time: 8:16 AM       Indication:  Diagnostic Cerebral angiogram due to [facility]    Consent: I have discussed with the patient and/or the patient representative the indication, alternatives, and the possible risks and/or complications of the planned procedure and the anesthesia methods. The patient and/or patient representative appear to understand and agree to proceed. Vital Signs:   Vitals:    08/10/23 0215                       8/10/23 0545   BP: 134/84                               115/87   Pulse: 71                                   69   Resp: 16   Temp: 98                                        98.1   SpO2:        Past Medical History:   has a past medical history of Asthma and Hypertension. Past Surgical History:   has a past surgical history that includes heent and orthopedic surgery (2003 and unknown). Medications:   Scheduled Meds:    cetirizine  10 mg Oral Daily    atorvastatin  40 mg Oral Nightly    famotidine  20 mg Oral BID    levETIRAcetam  500 mg Oral BID    niMODipine  60 mg Oral 6 times per day    sodium chloride flush  5-40 mL IntraVENous 2 times per day     Continuous Infusions:    lactated ringers IV soln 75 mL/hr at 08/10/23 7630    sodium chloride      dextrose       PRN Meds: albuterol, butalbital-acetaminophen-caffeine, labetalol, sodium chloride flush, sodium chloride, acetaminophen, ondansetron **OR** ondansetron, magnesium sulfate, glucose, dextrose bolus **OR** dextrose bolus, glucagon (rDNA), dextrose  Home Meds:   Prior to Admission medications    Medication Sig Start Date End Date Taking?  Authorizing Provider   albuterol sulfate HFA (PROVENTIL;VENTOLIN;PROAIR) 108 (90 Base) MCG/ACT inhaler Inhale 2 puffs into the lungs    Ar Automatic Reconciliation   lisinopril (PRINIVIL;ZESTRIL) 10 MG tablet Take 1 tablet by mouth

## 2023-08-11 VITALS
OXYGEN SATURATION: 100 % | TEMPERATURE: 98 F | DIASTOLIC BLOOD PRESSURE: 93 MMHG | RESPIRATION RATE: 20 BRPM | HEIGHT: 66 IN | SYSTOLIC BLOOD PRESSURE: 131 MMHG | HEART RATE: 77 BPM | BODY MASS INDEX: 26.68 KG/M2 | WEIGHT: 166.01 LBS

## 2023-08-11 LAB
ANION GAP SERPL CALC-SCNC: 5 MMOL/L (ref 5–15)
BUN SERPL-MCNC: 8 MG/DL (ref 6–20)
BUN/CREAT SERPL: 12 (ref 12–20)
CALCIUM SERPL-MCNC: 8.8 MG/DL (ref 8.5–10.1)
CHLORIDE SERPL-SCNC: 111 MMOL/L (ref 97–108)
CO2 SERPL-SCNC: 23 MMOL/L (ref 21–32)
CREAT SERPL-MCNC: 0.66 MG/DL (ref 0.7–1.3)
ERYTHROCYTE [DISTWIDTH] IN BLOOD BY AUTOMATED COUNT: 11.6 % (ref 11.5–14.5)
GLUCOSE SERPL-MCNC: 80 MG/DL (ref 65–100)
HCT VFR BLD AUTO: 45.7 % (ref 36.6–50.3)
HGB BLD-MCNC: 15.5 G/DL (ref 12.1–17)
MAGNESIUM SERPL-MCNC: 2.5 MG/DL (ref 1.6–2.4)
MCH RBC QN AUTO: 29.4 PG (ref 26–34)
MCHC RBC AUTO-ENTMCNC: 33.9 G/DL (ref 30–36.5)
MCV RBC AUTO: 86.6 FL (ref 80–99)
NRBC # BLD: 0 K/UL (ref 0–0.01)
NRBC BLD-RTO: 0 PER 100 WBC
PLATELET # BLD AUTO: 244 K/UL (ref 150–400)
PMV BLD AUTO: 9.5 FL (ref 8.9–12.9)
POTASSIUM SERPL-SCNC: 3.8 MMOL/L (ref 3.5–5.1)
RBC # BLD AUTO: 5.28 M/UL (ref 4.1–5.7)
SODIUM SERPL-SCNC: 139 MMOL/L (ref 136–145)
WBC # BLD AUTO: 7.2 K/UL (ref 4.1–11.1)

## 2023-08-11 PROCEDURE — 83735 ASSAY OF MAGNESIUM: CPT

## 2023-08-11 PROCEDURE — 85027 COMPLETE CBC AUTOMATED: CPT

## 2023-08-11 PROCEDURE — 6370000000 HC RX 637 (ALT 250 FOR IP): Performed by: NURSE PRACTITIONER

## 2023-08-11 PROCEDURE — 6360000002 HC RX W HCPCS

## 2023-08-11 PROCEDURE — 36415 COLL VENOUS BLD VENIPUNCTURE: CPT

## 2023-08-11 PROCEDURE — 80048 BASIC METABOLIC PNL TOTAL CA: CPT

## 2023-08-11 PROCEDURE — 6370000000 HC RX 637 (ALT 250 FOR IP): Performed by: HOSPITALIST

## 2023-08-11 PROCEDURE — 2580000003 HC RX 258: Performed by: NURSE PRACTITIONER

## 2023-08-11 RX ORDER — AMLODIPINE BESYLATE 5 MG/1
5 TABLET ORAL DAILY
Qty: 30 TABLET | Refills: 0 | Status: SHIPPED | OUTPATIENT
Start: 2023-08-11 | End: 2023-09-10

## 2023-08-11 RX ORDER — BUTALBITAL, ACETAMINOPHEN AND CAFFEINE 50; 325; 40 MG/1; MG/1; MG/1
1 TABLET ORAL EVERY 6 HOURS PRN
Qty: 20 TABLET | Refills: 0 | Status: SHIPPED | OUTPATIENT
Start: 2023-08-11

## 2023-08-11 RX ORDER — NIMODIPINE 30 MG/1
60 CAPSULE, LIQUID FILLED ORAL EVERY 4 HOURS
Qty: 30 CAPSULE | Refills: 3 | Status: SHIPPED | OUTPATIENT
Start: 2023-08-11 | End: 2023-08-25

## 2023-08-11 RX ORDER — METOCLOPRAMIDE HYDROCHLORIDE 5 MG/ML
10 INJECTION INTRAMUSCULAR; INTRAVENOUS EVERY 6 HOURS PRN
Status: DISCONTINUED | OUTPATIENT
Start: 2023-08-11 | End: 2023-08-11 | Stop reason: HOSPADM

## 2023-08-11 RX ORDER — OXYCODONE HYDROCHLORIDE AND ACETAMINOPHEN 5; 325 MG/1; MG/1
1 TABLET ORAL EVERY 6 HOURS PRN
Qty: 10 TABLET | Refills: 0 | Status: SHIPPED | OUTPATIENT
Start: 2023-08-11 | End: 2023-08-14

## 2023-08-11 RX ADMIN — FAMOTIDINE 20 MG: 20 TABLET ORAL at 09:28

## 2023-08-11 RX ADMIN — BUTALBITAL, ACETAMINOPHEN, AND CAFFEINE 1 TABLET: 325; 50; 40 TABLET ORAL at 09:27

## 2023-08-11 RX ADMIN — NIMODIPINE 60 MG: 30 CAPSULE, LIQUID FILLED ORAL at 02:30

## 2023-08-11 RX ADMIN — NIMODIPINE 60 MG: 30 CAPSULE, LIQUID FILLED ORAL at 14:23

## 2023-08-11 RX ADMIN — SODIUM CHLORIDE, PRESERVATIVE FREE 10 ML: 5 INJECTION INTRAVENOUS at 09:28

## 2023-08-11 RX ADMIN — METOCLOPRAMIDE HYDROCHLORIDE 10 MG: 5 INJECTION INTRAMUSCULAR; INTRAVENOUS at 02:34

## 2023-08-11 RX ADMIN — CETIRIZINE HYDROCHLORIDE 10 MG: 10 TABLET ORAL at 09:28

## 2023-08-11 RX ADMIN — NIMODIPINE 60 MG: 30 CAPSULE, LIQUID FILLED ORAL at 06:42

## 2023-08-11 RX ADMIN — NIMODIPINE 60 MG: 30 CAPSULE, LIQUID FILLED ORAL at 10:27

## 2023-08-11 ASSESSMENT — PAIN SCALES - GENERAL: PAINLEVEL_OUTOF10: 2

## 2023-08-11 ASSESSMENT — PAIN DESCRIPTION - LOCATION: LOCATION: HEAD

## 2023-08-11 NOTE — PLAN OF CARE
Problem: Discharge Planning  Goal: Discharge to home or other facility with appropriate resources  Outcome: Completed  Flowsheets (Taken 8/11/2023 0800)  Discharge to home or other facility with appropriate resources: Identify barriers to discharge with patient and caregiver     Problem: Pain  Goal: Verbalizes/displays adequate comfort level or baseline comfort level  Outcome: Completed     Problem: Safety - Adult  Goal: Free from fall injury  Outcome: Completed  Flowsheets (Taken 8/11/2023 0800)  Free From Fall Injury: Instruct family/caregiver on patient safety     Problem: Chronic Conditions and Co-morbidities  Goal: Patient's chronic conditions and co-morbidity symptoms are monitored and maintained or improved  Outcome: Completed  Flowsheets (Taken 8/11/2023 0800)  Care Plan - Patient's Chronic Conditions and Co-Morbidity Symptoms are Monitored and Maintained or Improved: Monitor and assess patient's chronic conditions and comorbid symptoms for stability, deterioration, or improvement

## 2023-08-11 NOTE — DISCHARGE INSTRUCTIONS
Discharge Instructions       PATIENT ID: Jaspreet Arrieta  MRN: [id number]   YOB: 1980    DATE OF ADMISSION: 8/3/2023   DATE OF DISCHARGE: 8/11/2023    PRIMARY CARE PROVIDER: No primary care provider on file. ATTENDING PHYSICIAN: Xiomara Moreau MD   DISCHARGING PROVIDER: Manpreet Bernal MD    To contact this individual call [phone] and ask the  to page. If unavailable ask to be transferred the Adult Hospitalist Department. DISCHARGE DIAGNOSES and care recommendations  None aneurysmal subarachnoid hemorrhage. Take the prescribed medication as instructed  Avoid blood thinners, NSAIDs such as ibuprofen, Motrin, Aleve etc.  If you experience recurrence of severe headache, seizure-like activity, difficulty with gait and balance call 911 and go to the emergency room immediately  Follow-up with neurointerventional doctors as scheduled  Please follow-up with your primary doctor in a week, for posthospital evaluation, blood pressure monitoring and blood work to check your sodium. Blood pressure has been running in the normal range. Please monitor your blood pressure and if your BP is high, persistently above 140/90, you may use the newly prescribed medication amlodipine (Norvasc) 5 mg daily      CONSULTATIONS: You were evaluated by neurologist and the neurointerventional radiologist    PROCEDURES/SURGERIES: * No surgery found *    PENDING TEST RESULTS:   At the time of discharge the following test results are still pending: none    FOLLOW UP APPOINTMENTS:   Neurointerventional radiologist  Medical doctor within a week of hospital discharge    ADDITIONAL CARE RECOMMENDATIONS:     DIET: regular diet    ACTIVITY: activity as tolerated          DISCHARGE MEDICATIONS:     Medication List      START taking these medications     amLODIPine 5 MG tablet; Commonly known as: Hiren Bull;  Take 1 tablet by   mouth daily   butalbital-acetaminophen-caffeine -40 MG per tablet;

## 2023-08-13 ENCOUNTER — APPOINTMENT (OUTPATIENT)
Facility: HOSPITAL | Age: 43
End: 2023-08-13
Payer: COMMERCIAL

## 2023-08-13 ENCOUNTER — HOSPITAL ENCOUNTER (EMERGENCY)
Facility: HOSPITAL | Age: 43
Discharge: HOME | End: 2023-08-13
Attending: EMERGENCY MEDICINE
Payer: COMMERCIAL

## 2023-08-13 VITALS
BODY MASS INDEX: 26.08 KG/M2 | RESPIRATION RATE: 18 BRPM | DIASTOLIC BLOOD PRESSURE: 101 MMHG | OXYGEN SATURATION: 97 % | HEIGHT: 66 IN | TEMPERATURE: 98.4 F | HEART RATE: 74 BPM | WEIGHT: 162.26 LBS | SYSTOLIC BLOOD PRESSURE: 147 MMHG

## 2023-08-13 DIAGNOSIS — R51.9 ACUTE NONINTRACTABLE HEADACHE, UNSPECIFIED HEADACHE TYPE: Primary | ICD-10-CM

## 2023-08-13 LAB
ALBUMIN SERPL-MCNC: 4.6 G/DL (ref 3.5–5)
ALBUMIN/GLOB SERPL: 1.2 (ref 1.1–2.2)
ALP SERPL-CCNC: 76 U/L (ref 45–117)
ALT SERPL-CCNC: 29 U/L (ref 12–78)
ANION GAP SERPL CALC-SCNC: 7 MMOL/L (ref 5–15)
AST SERPL-CCNC: 11 U/L (ref 15–37)
BASOPHILS # BLD: 0 K/UL (ref 0–0.1)
BASOPHILS NFR BLD: 0 % (ref 0–1)
BILIRUB SERPL-MCNC: 0.4 MG/DL (ref 0.2–1)
BUN SERPL-MCNC: 11 MG/DL (ref 6–20)
BUN/CREAT SERPL: 14 (ref 12–20)
CALCIUM SERPL-MCNC: 9.7 MG/DL (ref 8.5–10.1)
CHLORIDE SERPL-SCNC: 102 MMOL/L (ref 97–108)
CO2 SERPL-SCNC: 25 MMOL/L (ref 21–32)
COMMENT:: NORMAL
CREAT SERPL-MCNC: 0.8 MG/DL (ref 0.7–1.3)
DIFFERENTIAL METHOD BLD: ABNORMAL
EOSINOPHIL # BLD: 0 K/UL (ref 0–0.4)
EOSINOPHIL NFR BLD: 1 % (ref 0–7)
ERYTHROCYTE [DISTWIDTH] IN BLOOD BY AUTOMATED COUNT: 11.3 % (ref 11.5–14.5)
GLOBULIN SER CALC-MCNC: 3.7 G/DL (ref 2–4)
GLUCOSE SERPL-MCNC: 109 MG/DL (ref 65–100)
HCT VFR BLD AUTO: 47.8 % (ref 36.6–50.3)
HGB BLD-MCNC: 16.5 G/DL (ref 12.1–17)
IMM GRANULOCYTES # BLD AUTO: 0 K/UL (ref 0–0.04)
IMM GRANULOCYTES NFR BLD AUTO: 0 % (ref 0–0.5)
LYMPHOCYTES # BLD: 1.3 K/UL (ref 0.8–3.5)
LYMPHOCYTES NFR BLD: 17 % (ref 12–49)
MCH RBC QN AUTO: 29.4 PG (ref 26–34)
MCHC RBC AUTO-ENTMCNC: 34.5 G/DL (ref 30–36.5)
MCV RBC AUTO: 85.1 FL (ref 80–99)
MONOCYTES # BLD: 0.5 K/UL (ref 0–1)
MONOCYTES NFR BLD: 7 % (ref 5–13)
NEUTS SEG # BLD: 5.5 K/UL (ref 1.8–8)
NEUTS SEG NFR BLD: 75 % (ref 32–75)
NRBC # BLD: 0 K/UL (ref 0–0.01)
NRBC BLD-RTO: 0 PER 100 WBC
PLATELET # BLD AUTO: 284 K/UL (ref 150–400)
PMV BLD AUTO: 9.3 FL (ref 8.9–12.9)
POTASSIUM SERPL-SCNC: 3.3 MMOL/L (ref 3.5–5.1)
PROT SERPL-MCNC: 8.3 G/DL (ref 6.4–8.2)
RBC # BLD AUTO: 5.62 M/UL (ref 4.1–5.7)
SODIUM SERPL-SCNC: 134 MMOL/L (ref 136–145)
SPECIMEN HOLD: NORMAL
WBC # BLD AUTO: 7.3 K/UL (ref 4.1–11.1)

## 2023-08-13 PROCEDURE — 6360000004 HC RX CONTRAST MEDICATION: Performed by: RADIOLOGY

## 2023-08-13 PROCEDURE — 99285 EMERGENCY DEPT VISIT HI MDM: CPT

## 2023-08-13 PROCEDURE — 36415 COLL VENOUS BLD VENIPUNCTURE: CPT

## 2023-08-13 PROCEDURE — 80053 COMPREHEN METABOLIC PANEL: CPT

## 2023-08-13 PROCEDURE — 70498 CT ANGIOGRAPHY NECK: CPT

## 2023-08-13 PROCEDURE — 85025 COMPLETE CBC W/AUTO DIFF WBC: CPT

## 2023-08-13 PROCEDURE — 70450 CT HEAD/BRAIN W/O DYE: CPT

## 2023-08-13 PROCEDURE — 99254 IP/OBS CNSLTJ NEW/EST MOD 60: CPT | Performed by: NURSE PRACTITIONER

## 2023-08-13 RX ADMIN — IOPAMIDOL 100 ML: 755 INJECTION, SOLUTION INTRAVENOUS at 17:49

## 2023-08-13 ASSESSMENT — PAIN SCALES - GENERAL
PAINLEVEL_OUTOF10: 3
PAINLEVEL_OUTOF10: 0
PAINLEVEL_OUTOF10: 1

## 2023-08-13 ASSESSMENT — PAIN - FUNCTIONAL ASSESSMENT
PAIN_FUNCTIONAL_ASSESSMENT: 0-10
PAIN_FUNCTIONAL_ASSESSMENT: 0-10

## 2023-08-13 ASSESSMENT — PAIN DESCRIPTION - LOCATION
LOCATION: HEAD
LOCATION: HEAD

## 2023-08-13 NOTE — ED PROVIDER NOTES
History of hypertension, asthma. [address] EMERGENCY DEP  EMERGENCY DEPARTMENT ENCOUNTER      Pt Name: Daniel Clinton  MRN: [id number]  [facility] 1980  Date of evaluation: 8/13/2023  Provider: Dilip Henriquez MD    CHIEF COMPLAINT       Chief Complaint   Patient presents with    Headache    Nausea         HISTORY OF PRESENT ILLNESS   (Location/Symptom, Timing/Onset, Context/Setting, Quality, Duration, Modifying Factors, Severity)  Note limiting factors. 75-year-old with a history of hypertension, asthma. He presents accompanied by his wife who helps provide some history. He was discharged from the hospital 2 days ago after being diagnosed with a nonaneurysmal subarachnoid hemorrhage. He was admitted to the hospital 10 days ago after the development of a sudden onset headache. The subarachnoid hemorrhage was discovered on CT. He was followed by neuro interventional radiology while hospitalized. He states that his initial headache 10 days ago was rated as a 10 out of 10. Throughout his hospitalization, he states that his headache was in the 3-4 range. It seemed better after discharge until today when it increased in intensity a little bit. He also had some nausea. He called the neuro interventional radiologist on-call and was referred to the ED for repeat imaging. He states that his headache is much better at present. He rates it as a 1 out of 10. No numbness, tingling, weakness or other neurological symptoms. Review of External Medical Records:     Nursing Notes were reviewed. REVIEW OF SYSTEMS    (2-9 systems for level 4, 10 or more for level 5)     Review of Systems    Except as noted above the remainder of the review of systems was reviewed and negative.        PAST MEDICAL HISTORY     Past Medical History:   Diagnosis Date    Asthma     Hypertension          SURGICAL HISTORY       Past Surgical History:   Procedure Laterality Date    HEENT      wisdom teeth    ORTHOPEDIC SURGERY

## 2023-08-13 NOTE — ED NOTES
Received patient from waiting room.  Ambulatory to room accompanied by significant other     Mima Gan RN  08/13/23 3502

## 2023-08-13 NOTE — ED NOTES
3:13 PM    38 yo M presents with nausea and belching with onset today. Pt was discharged 8/11 for [place]. Has had continued headache since admission which has not changes. BP in triage 168/100. Spoke with neurosurgery office today who recommended coming to the ED. I have evaluated the patient as the Provider in Rapid Medical Evaluation (RME). I have reviewed his vital signs and the triage nurse assessment. I have talked with the patient and any available family and advised that I am the provider in triage and have ordered the appropriate study to initiate their work up based on the clinical presentation during my assessment. I have advised that the patient will be accommodated in the Main ED as soon as possible. I have also requested to contact the triage nurse or myself immediately if the patient experiences any changes in their condition during this brief waiting period.   Zachary Sousa, [address] [place], [place]  08/13/23 3092

## 2023-08-13 NOTE — ED TRIAGE NOTES
Patient arrives to ER with c/o headache/nausea. Patient said the headaches have been since he was discharged. The nausea is new, no vomiting just burping. Recently d/c 2 days ago after having subarachnoid hemorrhage.

## 2023-08-13 NOTE — CONSULTS
TCDs 8/6/2023: No evidence of vasospasm. TCDs 8/7/2023: No evidence of vasospasm. TCDs 8/8/2023: No evidence of vasospasm. TCDs 8/9/2023: No evidence of vasospasm. Assessment/Plan:     - Perimesencephalic subarachnoid hemorrhage, Cartwright Hough 1, Molina :1, DSA 8/4/23 and 8/10/23 negative for vascular source  - New subtle cortical and subcortical hypodensities in the right frontal lobe, catheter related infarcts from the DSA 8/10/23 vs delayed cerebral ischemia      - No evidence of cerebral vasospasm on TCDs or DSA 8/10/23   - Obtain CTA head/neck   - SBP goal less than 140   - Cont nimodipine for 21 days (today is day 10 of 21)   - No seizure ppx indicated    I have discussed the diagnosis and the intended plan as seen in the above orders with Dr. Jocelyne Vera and Dr Glen Muñoz. If CTA negative, pt can be discharged home with planned office follow up with Dr Ki Mitchell 8/30/2023 at 2 pm.    Thank you for this consult and participating in the care of this patient. I have spent 60 minutes of time involved in chart review, lab review, imaging review, consultations with specialists and attendings, family discussion/decision making and documentation.      Signed By: SIMONE Granados NP, Neurocritical Care Nurse Practitioner    August 13, 2023

## 2023-08-14 ENCOUNTER — CARE COORDINATION (OUTPATIENT)
Dept: OTHER | Facility: CLINIC | Age: 43
End: 2023-08-14

## 2023-08-14 RX ORDER — FAMOTIDINE 10 MG
10 TABLET ORAL 2 TIMES DAILY
COMMUNITY

## 2023-08-14 RX ORDER — CETIRIZINE HYDROCHLORIDE 10 MG/1
10 TABLET ORAL DAILY
COMMUNITY

## 2023-08-14 NOTE — CARE COORDINATION
[facility] Care Transitions Initial Follow Up Call    Call within 2 business days of discharge: Yes    Patient Current Location:  [facility] Transition Nurse contacted the patient by telephone to perform post hospital discharge assessment. Verified name and  with patient as identifiers. Provided introduction to self, and explanation of the Care Transition Nurse role. Patient: Ren Munoz Patient : 1980   MRN: [id number]  Reason for Admission: Nonaneurysmal subarachnoid hemorrhage [facility]). Discharge Date: 23 RARS: Readmission Risk Score: 5.1      Last Discharge [address] [facility]       Date Complaint Diagnosis Description Type Department Provider    23 Headache; Nausea Acute nonintractable headache, unspecified headache type ED (Jaime Adair) Zeb Diaz MD            Was this an external facility discharge? No Discharge Facility: [facility]    Challenges to be reviewed by the provider   Additional needs identified to be addressed with provider: No  advance care planning-not on file  Labs - abnormal              Method of communication with provider: none. Pt was admitted to [facility] 8/3/23 - 23  Diagnosis: Nonaneurysmal subarachnoid hemorrhage [facility]). Pt was seen at [facility] ER 23  Diagnosis: H/A, nausea     Pt reported feeling much better today only reporting a, \"dull intermittent H/A. \" No further issues with nausea today after taking Pepcid this AM. Pt reported stool remains loose, but better; denies blood. B/P remains elevated - 140/100. Pt will address at PCP appt tomorrow. Advised pt to avoid stimulants such as decongestants and high caffeine consumption if he consumes any of the following and to limit sodium intake. Encouraged staying well hydrated with water. Patient denies C/P, SOB, cough, wheezing, fever, swelling of legs or feet, N/V, difficulty urinating or constipation. Appetite/hydration good. Pt reported legs hurting, but better.  Pt contributes to long

## 2023-08-23 ENCOUNTER — CARE COORDINATION (OUTPATIENT)
Dept: OTHER | Facility: CLINIC | Age: 43
End: 2023-08-23

## 2023-08-23 NOTE — CARE COORDINATION
healthcare. Advance Care Planning:   not on file. Patients top risk factors for readmission: Nonaneurysmal subarachnoid hemorrhage [facility]). Interventions to address risk factors: Scheduled appointment with PCP-attended appt 8/15/23 f/u TBD, Scheduled appointment with Specialist-neuro - 8/30/23, and Obtained and reviewed discharge summary and/or continuity of care documents    Offered patient enrollment in the Remote Patient Monitoring (RPM) program for in-home monitoring: NA.     Care Transitions Subsequent and Final Call    Subsequent and Final Calls  Care Transitions Interventions  Other Interventions:             Care Transition Nurse provided contact information for future needs. Plan for follow-up call in 10-14 days based on severity of symptoms and risk factors. Plan for next call: symptom management-related to H/A   follow-up appointment-PCP and neuro        Goals        Attends follow-up appointments as directed. PCP - 8/15/23  Neuro - 8/30/23  RTW - TBD    8/24/23  PCP - attended appt 8/15/23 f/u TBD  Neuro - 8/30/23  RTW - TBD       Knowledge and adherence of prescribed medication (ie. action, side effects, missed dose, etc.). Taking prescribed meds       Supportive resources in place to maintain patient in the community (ie., home health, equipment, DME, refer to, etc.)      [facility] [facility] - # [phone]  [employer]   *Go to [email or web address] to create an account. Your copay is $15   Nurse Access line 24/7 located on the back of the insurance card  Be Well online   [address] [facility] Urgent [address]  # [phone]  HR Service Now - [facility] Workday  IT - [phone]  MyChart Help - # [phone]- [phone]  Leave of absence # [phone] option 1 or call the dedicated line at [address] [place] [address] ([phone]). [employer] agents are available weekdays 8:30 a.m. - 10:30 p.m. ET.   [employer] - [phone] Go to

## 2023-08-29 ENCOUNTER — ENROLLMENT (OUTPATIENT)
Dept: OTHER | Facility: CLINIC | Age: 43
End: 2023-08-29

## 2023-08-30 ENCOUNTER — OFFICE VISIT (OUTPATIENT)
Age: 43
End: 2023-08-30
Payer: COMMERCIAL

## 2023-08-30 VITALS
HEART RATE: 80 BPM | WEIGHT: 165 LBS | DIASTOLIC BLOOD PRESSURE: 86 MMHG | BODY MASS INDEX: 26.52 KG/M2 | TEMPERATURE: 97.4 F | OXYGEN SATURATION: 97 % | HEIGHT: 66 IN | SYSTOLIC BLOOD PRESSURE: 128 MMHG

## 2023-08-30 DIAGNOSIS — I60.9 SAH (SUBARACHNOID HEMORRHAGE) (HCC): Primary | ICD-10-CM

## 2023-08-30 PROCEDURE — 99213 OFFICE O/P EST LOW 20 MIN: CPT | Performed by: RADIOLOGY

## 2023-09-25 ENCOUNTER — CARE COORDINATION (OUTPATIENT)
Dept: OTHER | Facility: CLINIC | Age: 43
End: 2023-09-25

## 2023-09-25 NOTE — CARE COORDINATION
Resolving current episode JANET (Transitions of care complete). No further ED/UC or hospital admissions within 30 days post discharge. Patient attended follow-up appointments as directed. No outreach from patient to [address] [place] [address]. Pt reported he is doing well, attended appt's and is now back to work. Contact info provided for future reference.

## 2023-10-31 ENCOUNTER — TELEPHONE (OUTPATIENT)
Age: 43
End: 2023-10-31

## 2023-10-31 DIAGNOSIS — I60.9 SAH (SUBARACHNOID HEMORRHAGE) (HCC): Primary | ICD-10-CM

## 2023-10-31 NOTE — TELEPHONE ENCOUNTER
Patient called and left a voicemail with a couple of questions for Dr. Jimmy Gaitan, or his nurse:    1) Is there any concerns with him getting the flu shot? 2) He continues to have neck pain and headaches. How long to expect this to continue?

## 2023-11-03 NOTE — TELEPHONE ENCOUNTER
Return call to patient regarding getting a flu shot and headaches. Dr Brijesh Uriarte out of the office. Spoke to Dr Ruth Franklin regarding this patient. Per provider, ok to have Flu shot. Duration of headaches vary with patients. Patient reports occasional anam headaches and neck pain. Reports no other symptoms. Informed patient is he has a thunderclap headache, seek urgent care. New order for Neurology consult. Patient stated understanding.

## 2024-01-03 ENCOUNTER — TELEPHONE (OUTPATIENT)
Age: 44
End: 2024-01-03

## 2024-01-03 NOTE — TELEPHONE ENCOUNTER
Left voicemail for patient requesting a call back so that we can assist patient in scheduling a follow-up appt for their imaging scheduled on 2/9/24.

## 2024-02-09 ENCOUNTER — HOSPITAL ENCOUNTER (OUTPATIENT)
Facility: HOSPITAL | Age: 44
End: 2024-02-09
Payer: COMMERCIAL

## 2024-02-09 ENCOUNTER — HOSPITAL ENCOUNTER (OUTPATIENT)
Facility: HOSPITAL | Age: 44
Discharge: HOME | End: 2024-02-09
Payer: COMMERCIAL

## 2024-02-09 DIAGNOSIS — I60.8: ICD-10-CM

## 2024-02-09 PROCEDURE — 70547 MR ANGIOGRAPHY NECK W/O DYE: CPT

## 2024-02-09 PROCEDURE — 70544 MR ANGIOGRAPHY HEAD W/O DYE: CPT

## 2024-02-26 ENCOUNTER — OFFICE VISIT (OUTPATIENT)
Age: 44
End: 2024-02-26
Payer: COMMERCIAL

## 2024-02-26 VITALS
HEART RATE: 70 BPM | TEMPERATURE: 97.4 F | SYSTOLIC BLOOD PRESSURE: 126 MMHG | BODY MASS INDEX: 27.97 KG/M2 | DIASTOLIC BLOOD PRESSURE: 84 MMHG | OXYGEN SATURATION: 98 % | HEIGHT: 66 IN | WEIGHT: 174 LBS

## 2024-02-26 DIAGNOSIS — I60.8 NON-ANEURYSMAL PERIMESENCEPHALIC SUBARACHNOID HEMORRHAGE (HCC): Primary | ICD-10-CM

## 2024-02-26 PROCEDURE — 99213 OFFICE O/P EST LOW 20 MIN: CPT | Performed by: NURSE PRACTITIONER

## 2024-02-26 NOTE — PROGRESS NOTES
No current facility-administered medications for this visit.        No Known Allergies    Review of Systems:  Pertinent items are noted in the History of Present Illness.    Objective:     Vitals:    08/30/23 1317   BP: 128/86   Site: Left Upper Arm   Position: Sitting   Pulse: 80   Temp: 97.4 °F (36.3 °C)   TempSrc: Temporal   SpO2: 97%   Weight: 74.8 kg (165 lb)   Height: 1.676 m (5' 6\")        Physical Exam:  GENERAL: alert, cooperative, no distress, appears stated age  LUNG: clear to auscultation bilaterally  HEART: regular rate and rhythm  EXTREMITIES:  extremities normal, atraumatic, no cyanosis or edema    Neurologic Exam:  Mental Status:  Alert and oriented x 4.  Appropriate affect, mood and behavior.       Language:    Normal fluency, repetition, comprehension and naming.    Cranial Nerves:   EOMI, PERRLA      Facial sensation intact V1 - V3.     Full facial strength, no asymmetry.      Hearing intact bilaterally.     No dysarthria. Tongue protrudes to midline  symmetrically.      Shoulder shrug 5/5 bilaterally.    Motor:    No pronator drift.      Bulk and tone normal.      5/5 power in all extremities proximally and distally.     No involuntary movements.    Sensation:    Sensation intact throughout to light touch    Reflexes:    Deferred    Coordination & Gait: Normal      My interpretation of Imaging:       I reviewed imaging including catheter cerebral arteriogram, CTAs and MRI studies with the patient in detail.  No intracranial vascular pathology was identified on imaging.  No brain parenchymal abnormality or ischemic changes were noted.    Assessment:     Given multiple imaging studies including 2 catheter arteriograms were negative for any intracranial vascular pathology, no additional imaging or intervention is recommended at this time.  Patient to follow-up with MRA of the head and neck in 6 months for follow-up.    Patient understands and agrees to the plan.  All his questions answered to

## 2024-02-26 NOTE — PROGRESS NOTES
Follow up for SAH and imaging review.  Spouse at visit.  Patient reports occasional headaches but does not need to take anything for relief.  No acute problems voiced.  
reviewed the following imaging studies. The impressions listed below are those of the interpreting radiologist(s).    DSA 8/4/23: No aneurysm, AVM or AV fistula seen. No vasospasm identified.    CT head 8/13/23 with New subtle cortical and subcortical hypodensities in the right frontal lobe     DSA 8/10/23:  No aneurysm, AVM or AV fistula seen. No vasospasm identified.    MRA brain WO contrast 2/9/24:  No intracranial aneurysm, large vessel occlusion or  high-grade stenosis.    MRA neck WO contrast 2/9/24:  No aneurysm/pseudoaneurysm or suggestion of dissection,  large vessel occlusion or high-grade stenosis.    Assessment:     Duke Munoz is a 43 y.o. male established patient PMH of asthma, seasonal allergies, and HTN. He was admitted on 8/3/23 at [facility] for erimesencephalic subarachnoid hemorrhage, Cartwright Hough 1, Molina :1, DSA by Dr Leone 8/4/23 negative for vascular source. He underwent repeat DSA 8/10/23 by Dr Leone which was also negative for vascular source.     Neuro exam is normal. Follow up imaging again negative for vascular source of his prior SAH. Blood pressure has been well controlled. Discussed with pt that his risk of another SAH is low but not zero. Discussed with pt that he may take OTC antihistamines, flonase, azelastine, and/or mucinex for seasonal allergies but to avoid sudafed containing products as they can increase his BP.     Plan:     No further NIS follow up indicated. Neurology follow up for headache management.     Reviewed today with the patient:   - BEFAST, call 911 if symptoms present   - Strict BP control (less than 140/90 long term)   - Call 911 for \"worse headache of life\" or new neurologic symptoms   - Avoid straining    Thank you for allowing me to participate in the care of this patient.    I have spent at least 20 minutes reviewing previous notes, test results and face to face (virtual) with the patient discussing the diagnosis and importance of compliance with the

## 2024-05-13 ENCOUNTER — OFFICE VISIT (OUTPATIENT)
Age: 44
End: 2024-05-13
Payer: COMMERCIAL

## 2024-05-13 VITALS
BODY MASS INDEX: 29.41 KG/M2 | WEIGHT: 183 LBS | DIASTOLIC BLOOD PRESSURE: 84 MMHG | HEIGHT: 66 IN | OXYGEN SATURATION: 99 % | SYSTOLIC BLOOD PRESSURE: 138 MMHG | HEART RATE: 82 BPM

## 2024-05-13 DIAGNOSIS — Z86.79 HISTORY OF SUBARACHNOID HEMORRHAGE: Primary | ICD-10-CM

## 2024-05-13 DIAGNOSIS — G44.229 CHRONIC TENSION-TYPE HEADACHE, NOT INTRACTABLE: ICD-10-CM

## 2024-05-13 PROCEDURE — 99204 OFFICE O/P NEW MOD 45 MIN: CPT | Performed by: PSYCHIATRY & NEUROLOGY

## 2024-05-13 RX ORDER — LEVOCETIRIZINE DIHYDROCHLORIDE 5 MG/1
5 TABLET, FILM COATED ORAL NIGHTLY
COMMUNITY

## 2024-05-13 RX ORDER — LOSARTAN POTASSIUM 50 MG/1
TABLET ORAL
COMMUNITY
Start: 2022-11-18

## 2024-05-13 NOTE — PROGRESS NOTES
Chief Complaint   Patient presents with    Neurologic Problem     Subarachnoid Hemorrhage     Vitals:    05/13/24 1306   BP: 138/84   Pulse: 82   SpO2: 99%       
cerebral arteries: Patent  Anterior communicating artery: Patent  Right middle cerebral artery: Patent  Left middle cerebral artery: Patent  Posterior communicating arteries: Patent, hypoplastic on the left  Posterior cerebral arteries: Patent  Basilar artery: Patent  Distal vertebral arteries: Patent  No evidence for intracranial aneurysm or hemodynamically significant stenosis.    Impression  No acute large vessel occlusion or flow-limiting stenosis.. No aneurysm,  dissection, or vascular malformation.    Imaging independently reviewed    ASSESSMENT   Diagnosis Orders   1. History of subarachnoid hemorrhage        2. Chronic tension-type headache, not intractable            DISCUSSION  Mr. Duke Munoz had a spontaneous perimesencephalic hemorrhage back in August for which he recovered quite well.  He had digital subtraction angiography twice which did not reveal any vascular abnormalities or aneurysm.  Follow-up CT imaging did show a couple of embolic appearing infarctions in the right frontal lobe suspected to be related to the catheter.    He was experiencing frequent headaches which have now significantly improved.  He still gets a mild dull ache in the occipital region every 2 to 3 days and I suspect this may be a tension type headache or cervicogenic headache.  We discussed proper posture and ergonomics and the role of cervical paraspinal muscle strengthening and stretching.   May use Tylenol as needed   Prophylactics not indicated at this time      Nils Olivarez MD  Diplomate, American Board of Psychiatry & Neurology (Neurology)  Diplomate, American Board of Psychiatry & Neurology (Clinical Neurophysiology)  Diplomate, American Board of Electrodiagnostic Medicine

## 2024-06-13 ENCOUNTER — TELEPHONE (OUTPATIENT)
Age: 44
End: 2024-06-13

## 2024-06-13 NOTE — TELEPHONE ENCOUNTER
Lm to schedule consult from faxed referral    Added to WQ    NP KAREN, asa roland, vinicius ndb, notes in drawer 6.13.24

## 2024-07-01 ENCOUNTER — PREP FOR PROCEDURE (OUTPATIENT)
Age: 44
End: 2024-07-01

## 2024-07-01 ENCOUNTER — OFFICE VISIT (OUTPATIENT)
Age: 44
End: 2024-07-01
Payer: COMMERCIAL

## 2024-07-01 VITALS
OXYGEN SATURATION: 96 % | SYSTOLIC BLOOD PRESSURE: 125 MMHG | WEIGHT: 168.2 LBS | TEMPERATURE: 97.6 F | HEIGHT: 66 IN | DIASTOLIC BLOOD PRESSURE: 84 MMHG | BODY MASS INDEX: 27.03 KG/M2 | HEART RATE: 68 BPM

## 2024-07-01 DIAGNOSIS — K40.90 RIGHT INGUINAL HERNIA: Primary | ICD-10-CM

## 2024-07-01 PROCEDURE — 99204 OFFICE O/P NEW MOD 45 MIN: CPT | Performed by: SURGERY

## 2024-07-01 ASSESSMENT — PATIENT HEALTH QUESTIONNAIRE - PHQ9
SUM OF ALL RESPONSES TO PHQ QUESTIONS 1-9: 0
SUM OF ALL RESPONSES TO PHQ QUESTIONS 1-9: 0
1. LITTLE INTEREST OR PLEASURE IN DOING THINGS: NOT AT ALL
SUM OF ALL RESPONSES TO PHQ9 QUESTIONS 1 & 2: 0
2. FEELING DOWN, DEPRESSED OR HOPELESS: NOT AT ALL
SUM OF ALL RESPONSES TO PHQ QUESTIONS 1-9: 0
SUM OF ALL RESPONSES TO PHQ QUESTIONS 1-9: 0

## 2024-07-01 ASSESSMENT — ENCOUNTER SYMPTOMS
COUGH: 0
SHORTNESS OF BREATH: 0
WHEEZING: 0
CONSTIPATION: 0
BLOOD IN STOOL: 0
SORE THROAT: 0
EYE PAIN: 0
DIARRHEA: 0
BACK PAIN: 0
STRIDOR: 0
NAUSEA: 0
ABDOMINAL PAIN: 1
VOMITING: 0

## 2024-07-01 NOTE — H&P (VIEW-ONLY)
unexpected weight change.   HENT:  Negative for congestion, ear pain and sore throat.    Eyes:  Negative for pain.   Respiratory:  Negative for cough, shortness of breath, wheezing and stridor.    Cardiovascular:  Negative for chest pain, palpitations and leg swelling.   Gastrointestinal:  Positive for abdominal pain. Negative for blood in stool, constipation, diarrhea, nausea and vomiting.   Endocrine: Negative for polydipsia.   Genitourinary:  Negative for dysuria, flank pain, frequency, hematuria and urgency.   Musculoskeletal:  Negative for arthralgias, back pain, gait problem and myalgias.   Neurological:  Negative for dizziness, seizures, weakness, numbness and headaches.   Psychiatric/Behavioral:  Negative for behavioral problems. The patient is not nervous/anxious.          /84 (Site: Left Upper Arm, Position: Sitting, Cuff Size: Large Adult)   Pulse 68   Temp 97.6 °F (36.4 °C) (Temporal)   Ht 1.676 m (5' 6\")   Wt 76.3 kg (168 lb 3.2 oz)   SpO2 96%   BMI 27.15 kg/m²     Objective:   Physical Exam  Vitals and nursing note reviewed.   Constitutional:       General: He is not in acute distress.     Appearance: Normal appearance. He is well-developed. He is not ill-appearing or diaphoretic.   HENT:      Head: Normocephalic and atraumatic.   Eyes:      General: No scleral icterus.     Extraocular Movements: Extraocular movements intact.      Conjunctiva/sclera: Conjunctivae normal.      Pupils: Pupils are equal, round, and reactive to light.   Neck:      Thyroid: No thyroid mass or thyromegaly.      Trachea: Trachea and phonation normal. No tracheal deviation.   Cardiovascular:      Rate and Rhythm: Normal rate and regular rhythm.      Heart sounds: Normal heart sounds. No murmur heard.     No friction rub. No gallop.   Pulmonary:      Effort: Pulmonary effort is normal. No respiratory distress.      Breath sounds: Normal breath sounds. No stridor. No wheezing or rales.   Abdominal:      General:

## 2024-07-01 NOTE — PROGRESS NOTES
Identified pt with two pt identifiers (name and ). Reviewed chart in preparation for visit and have obtained necessary documentation.    Duke Munoz is a 44 y.o. male  Chief Complaint   Patient presents with    Possible hernia     Seen at the request of SOFY Kuo     /84 (Site: Left Upper Arm, Position: Sitting, Cuff Size: Large Adult)   Pulse 68   Temp 97.6 °F (36.4 °C) (Temporal)   Ht 1.676 m (5' 6\")   Wt 76.3 kg (168 lb 3.2 oz)   SpO2 96%   BMI 27.15 kg/m²     1. Have you been to the ER, urgent care clinic since your last visit?  Hospitalized since your last visit?no    2. Have you seen or consulted any other health care providers outside of the [facility] since your last visit?  Include any pap smears or colon screening. no   
Bowel sounds are normal. There is no distension.      Palpations: There is no mass.      Tenderness: There is no abdominal tenderness. There is no right CVA tenderness, left CVA tenderness, guarding or rebound. Negative signs include George's sign and Rovsing's sign.      Hernia: A hernia is present. Hernia is present in the right inguinal area (medium reducible). There is no hernia in the umbilical area, ventral area or left inguinal area.       Musculoskeletal:         General: No swelling or tenderness. Normal range of motion.      Cervical back: Normal range of motion and neck supple.   Lymphadenopathy:      Cervical: No cervical adenopathy.      Upper Body:      Right upper body: No supraclavicular adenopathy.      Left upper body: No supraclavicular adenopathy.   Skin:     Coloration: Skin is not jaundiced.      Findings: No erythema or rash.   Neurological:      Mental Status: He is alert and oriented to person, place, and time.      Cranial Nerves: No cranial nerve deficit.      Coordination: Coordination normal.      Gait: Gait normal.   Psychiatric:         Behavior: Behavior normal.         Thought Content: Thought content normal.         Judgment: Judgment normal.             Assessment / Plan:       Left inguinal hernia, reducible.  I explained about the anatomy and pathophysiology of hernias and the risk of incarceration and strangulation of the bowel.  I explained about hernia repairs (open with and without mesh, and robotic assisted and laparoscopic with mesh).  I explained the risks and benefits of repair including bleeding, infection, chronic pain, orchalgia, loss of testes, bowel or bladder injury, hernia recurrence, seroma, mesh infection requiring removal.  I explained it would be a six to eight week recuperation with no driving for 5 - 7 days, no lifting for six weeks.    Hx subarachnoid hemorrhage.  Hx CVA x 2.  No residual  Essential hypertension.  Stable on rx  Asthma   controlled    He

## 2024-07-10 NOTE — PROGRESS NOTES
[facility]  Preoperative Instructions        Surgery Date July 17          Time of Arrival to be called   Contact#[phone]     On the day of your surgery, please report to Surgical Services Registration Desk and sign in at your designated time.  The Surgery Center is located to the right of the Emergency Room.     2. You must have someone with you to drive you home. You should not drive a car for 24 hours following surgery. Please make arrangements for a friend or family member to stay with you for the first 24 hours after your surgery.    3. Do not have anything to eat or drink (including water, gum, mints, coffee, juice) after midnight ?July 16?      .?This may not apply to medications prescribed by your physician. ?(Please note below the special instructions with medications to take the morning of your procedure.)    4. We recommend you do not drink any alcoholic beverages for 24 hours before and after your surgery.    5. Contact your surgeon's office for instructions on the following medications: non-steroidal anti-inflammatory drugs (i.e. Advil, Aleve), vitamins, and supplements. (Some surgeon's will want you to stop these medications prior to surgery and others may allow you to take them)  **If you are currently taking Plavix, Coumadin, Aspirin and/or other blood-thinning agents, contact your surgeon for instructions.** Your surgeon will partner with the physician prescribing these medications to determine if it is safe to stop or if you need to continue taking.  Please do not stop taking these medications without instructions from your surgeon    6. Wear comfortable clothes.  Wear glasses instead of contacts.  Do not bring any money or jewelry. Please bring picture ID, insurance card, and any prearranged co-payment or hospital payment.  Do not wear make-up, particularly mascara the morning of your surgery.  Do not wear nail polish, particularly if you are having foot /hand surgery.

## 2024-07-17 ENCOUNTER — ANESTHESIA (OUTPATIENT)
Facility: HOSPITAL | Age: 44
End: 2024-07-17
Payer: COMMERCIAL

## 2024-07-17 ENCOUNTER — HOSPITAL ENCOUNTER (OUTPATIENT)
Facility: HOSPITAL | Age: 44
Setting detail: OUTPATIENT SURGERY
Discharge: HOME | End: 2024-07-17
Attending: SURGERY | Admitting: SURGERY
Payer: COMMERCIAL

## 2024-07-17 ENCOUNTER — ANESTHESIA EVENT (OUTPATIENT)
Facility: HOSPITAL | Age: 44
End: 2024-07-17
Payer: COMMERCIAL

## 2024-07-17 VITALS
WEIGHT: 160.27 LBS | RESPIRATION RATE: 16 BRPM | TEMPERATURE: 97.7 F | SYSTOLIC BLOOD PRESSURE: 105 MMHG | OXYGEN SATURATION: 99 % | HEART RATE: 60 BPM | BODY MASS INDEX: 25.76 KG/M2 | DIASTOLIC BLOOD PRESSURE: 76 MMHG | HEIGHT: 66 IN

## 2024-07-17 DIAGNOSIS — K40.90 RIGHT INGUINAL HERNIA: Primary | ICD-10-CM

## 2024-07-17 PROCEDURE — 88302 TISSUE EXAM BY PATHOLOGIST: CPT

## 2024-07-17 PROCEDURE — 3600000002 HC SURGERY LEVEL 2 BASE: Performed by: SURGERY

## 2024-07-17 PROCEDURE — 3700000001 HC ADD 15 MINUTES (ANESTHESIA): Performed by: SURGERY

## 2024-07-17 PROCEDURE — 6360000002 HC RX W HCPCS: Performed by: NURSE ANESTHETIST, CERTIFIED REGISTERED

## 2024-07-17 PROCEDURE — 6370000000 HC RX 637 (ALT 250 FOR IP): Performed by: ANESTHESIOLOGY

## 2024-07-17 PROCEDURE — 2500000003 HC RX 250 WO HCPCS: Performed by: NURSE ANESTHETIST, CERTIFIED REGISTERED

## 2024-07-17 PROCEDURE — 7100000001 HC PACU RECOVERY - ADDTL 15 MIN: Performed by: SURGERY

## 2024-07-17 PROCEDURE — 6360000002 HC RX W HCPCS: Performed by: SURGERY

## 2024-07-17 PROCEDURE — 2580000003 HC RX 258: Performed by: ANESTHESIOLOGY

## 2024-07-17 PROCEDURE — 2709999900 HC NON-CHARGEABLE SUPPLY: Performed by: SURGERY

## 2024-07-17 PROCEDURE — 2500000003 HC RX 250 WO HCPCS: Performed by: SURGERY

## 2024-07-17 PROCEDURE — 7100000000 HC PACU RECOVERY - FIRST 15 MIN: Performed by: SURGERY

## 2024-07-17 PROCEDURE — 3700000000 HC ANESTHESIA ATTENDED CARE: Performed by: SURGERY

## 2024-07-17 PROCEDURE — 49505 PRP I/HERN INIT REDUC >5 YR: CPT | Performed by: SURGERY

## 2024-07-17 PROCEDURE — 2580000003 HC RX 258: Performed by: SURGERY

## 2024-07-17 PROCEDURE — 7100000010 HC PHASE II RECOVERY - FIRST 15 MIN: Performed by: SURGERY

## 2024-07-17 PROCEDURE — 7100000011 HC PHASE II RECOVERY - ADDTL 15 MIN: Performed by: SURGERY

## 2024-07-17 PROCEDURE — 3600000012 HC SURGERY LEVEL 2 ADDTL 15MIN: Performed by: SURGERY

## 2024-07-17 PROCEDURE — C1781 MESH (IMPLANTABLE): HCPCS | Performed by: SURGERY

## 2024-07-17 PROCEDURE — 2580000003 HC RX 258: Performed by: NURSE ANESTHETIST, CERTIFIED REGISTERED

## 2024-07-17 DEVICE — BARD SOFT MESH, 3" X 6" (7.5 CM X 15 CM)
Type: IMPLANTABLE DEVICE | Site: INGUINAL | Status: FUNCTIONAL
Brand: BARD

## 2024-07-17 RX ORDER — OXYCODONE HYDROCHLORIDE 5 MG/1
5 TABLET ORAL
Status: DISCONTINUED | OUTPATIENT
Start: 2024-07-17 | End: 2024-07-17 | Stop reason: HOSPADM

## 2024-07-17 RX ORDER — ONDANSETRON 2 MG/ML
4 INJECTION INTRAMUSCULAR; INTRAVENOUS ONCE
Status: DISCONTINUED | OUTPATIENT
Start: 2024-07-17 | End: 2024-07-17 | Stop reason: HOSPADM

## 2024-07-17 RX ORDER — SODIUM CHLORIDE, SODIUM LACTATE, POTASSIUM CHLORIDE, CALCIUM CHLORIDE 600; 310; 30; 20 MG/100ML; MG/100ML; MG/100ML; MG/100ML
INJECTION, SOLUTION INTRAVENOUS CONTINUOUS
Status: DISCONTINUED | OUTPATIENT
Start: 2024-07-17 | End: 2024-07-17 | Stop reason: HOSPADM

## 2024-07-17 RX ORDER — BUPIVACAINE HYDROCHLORIDE 5 MG/ML
INJECTION, SOLUTION PERINEURAL PRN
Status: DISCONTINUED | OUTPATIENT
Start: 2024-07-17 | End: 2024-07-17 | Stop reason: ALTCHOICE

## 2024-07-17 RX ORDER — OXYCODONE HYDROCHLORIDE AND ACETAMINOPHEN 5; 325 MG/1; MG/1
1 TABLET ORAL EVERY 6 HOURS PRN
Qty: 12 TABLET | Refills: 0 | Status: SHIPPED | OUTPATIENT
Start: 2024-07-17 | End: 2024-07-20

## 2024-07-17 RX ORDER — ONDANSETRON 2 MG/ML
INJECTION INTRAMUSCULAR; INTRAVENOUS PRN
Status: DISCONTINUED | OUTPATIENT
Start: 2024-07-17 | End: 2024-07-17 | Stop reason: SDUPTHER

## 2024-07-17 RX ORDER — FENTANYL CITRATE 50 UG/ML
100 INJECTION, SOLUTION INTRAMUSCULAR; INTRAVENOUS
Status: DISCONTINUED | OUTPATIENT
Start: 2024-07-17 | End: 2024-07-17 | Stop reason: HOSPADM

## 2024-07-17 RX ORDER — DEXMEDETOMIDINE HYDROCHLORIDE 100 UG/ML
INJECTION, SOLUTION INTRAVENOUS PRN
Status: DISCONTINUED | OUTPATIENT
Start: 2024-07-17 | End: 2024-07-17 | Stop reason: SDUPTHER

## 2024-07-17 RX ORDER — ACETAMINOPHEN 325 MG/1
650 TABLET ORAL
Status: DISCONTINUED | OUTPATIENT
Start: 2024-07-17 | End: 2024-07-17 | Stop reason: HOSPADM

## 2024-07-17 RX ORDER — SODIUM CHLORIDE 0.9 % (FLUSH) 0.9 %
5-40 SYRINGE (ML) INJECTION EVERY 12 HOURS SCHEDULED
Status: DISCONTINUED | OUTPATIENT
Start: 2024-07-17 | End: 2024-07-17 | Stop reason: HOSPADM

## 2024-07-17 RX ORDER — PROCHLORPERAZINE EDISYLATE 5 MG/ML
5 INJECTION INTRAMUSCULAR; INTRAVENOUS
Status: DISCONTINUED | OUTPATIENT
Start: 2024-07-17 | End: 2024-07-17 | Stop reason: HOSPADM

## 2024-07-17 RX ORDER — FENTANYL CITRATE 50 UG/ML
INJECTION, SOLUTION INTRAMUSCULAR; INTRAVENOUS PRN
Status: DISCONTINUED | OUTPATIENT
Start: 2024-07-17 | End: 2024-07-17 | Stop reason: SDUPTHER

## 2024-07-17 RX ORDER — SODIUM CHLORIDE 9 MG/ML
INJECTION, SOLUTION INTRAVENOUS PRN
Status: DISCONTINUED | OUTPATIENT
Start: 2024-07-17 | End: 2024-07-17 | Stop reason: HOSPADM

## 2024-07-17 RX ORDER — MIDAZOLAM HYDROCHLORIDE 2 MG/2ML
2 INJECTION, SOLUTION INTRAMUSCULAR; INTRAVENOUS
Status: DISCONTINUED | OUTPATIENT
Start: 2024-07-17 | End: 2024-07-17 | Stop reason: HOSPADM

## 2024-07-17 RX ORDER — LIDOCAINE HYDROCHLORIDE 20 MG/ML
INJECTION, SOLUTION EPIDURAL; INFILTRATION; INTRACAUDAL; PERINEURAL PRN
Status: DISCONTINUED | OUTPATIENT
Start: 2024-07-17 | End: 2024-07-17 | Stop reason: SDUPTHER

## 2024-07-17 RX ORDER — DEXAMETHASONE SODIUM PHOSPHATE 4 MG/ML
4 INJECTION, SOLUTION INTRA-ARTICULAR; INTRALESIONAL; INTRAMUSCULAR; INTRAVENOUS; SOFT TISSUE
Status: DISCONTINUED | OUTPATIENT
Start: 2024-07-17 | End: 2024-07-17 | Stop reason: HOSPADM

## 2024-07-17 RX ORDER — HYDROMORPHONE HYDROCHLORIDE 1 MG/ML
0.25 INJECTION, SOLUTION INTRAMUSCULAR; INTRAVENOUS; SUBCUTANEOUS EVERY 5 MIN PRN
Status: DISCONTINUED | OUTPATIENT
Start: 2024-07-17 | End: 2024-07-17 | Stop reason: HOSPADM

## 2024-07-17 RX ORDER — SODIUM CHLORIDE 0.9 % (FLUSH) 0.9 %
5-40 SYRINGE (ML) INJECTION PRN
Status: DISCONTINUED | OUTPATIENT
Start: 2024-07-17 | End: 2024-07-17 | Stop reason: HOSPADM

## 2024-07-17 RX ORDER — ACETAMINOPHEN 500 MG
1000 TABLET ORAL ONCE
Status: COMPLETED | OUTPATIENT
Start: 2024-07-17 | End: 2024-07-17

## 2024-07-17 RX ORDER — SODIUM CHLORIDE, SODIUM LACTATE, POTASSIUM CHLORIDE, CALCIUM CHLORIDE 600; 310; 30; 20 MG/100ML; MG/100ML; MG/100ML; MG/100ML
INJECTION, SOLUTION INTRAVENOUS CONTINUOUS PRN
Status: DISCONTINUED | OUTPATIENT
Start: 2024-07-17 | End: 2024-07-17 | Stop reason: SDUPTHER

## 2024-07-17 RX ORDER — HYDRALAZINE HYDROCHLORIDE 20 MG/ML
10 INJECTION INTRAMUSCULAR; INTRAVENOUS
Status: DISCONTINUED | OUTPATIENT
Start: 2024-07-17 | End: 2024-07-17 | Stop reason: HOSPADM

## 2024-07-17 RX ORDER — MIDAZOLAM HYDROCHLORIDE 1 MG/ML
INJECTION INTRAMUSCULAR; INTRAVENOUS PRN
Status: DISCONTINUED | OUTPATIENT
Start: 2024-07-17 | End: 2024-07-17 | Stop reason: SDUPTHER

## 2024-07-17 RX ORDER — FENTANYL CITRATE 50 UG/ML
25 INJECTION, SOLUTION INTRAMUSCULAR; INTRAVENOUS EVERY 5 MIN PRN
Status: DISCONTINUED | OUTPATIENT
Start: 2024-07-17 | End: 2024-07-17 | Stop reason: HOSPADM

## 2024-07-17 RX ORDER — NALOXONE HYDROCHLORIDE 0.4 MG/ML
INJECTION, SOLUTION INTRAMUSCULAR; INTRAVENOUS; SUBCUTANEOUS PRN
Status: DISCONTINUED | OUTPATIENT
Start: 2024-07-17 | End: 2024-07-17 | Stop reason: HOSPADM

## 2024-07-17 RX ORDER — DEXAMETHASONE SODIUM PHOSPHATE 10 MG/ML
INJECTION INTRAMUSCULAR; INTRAVENOUS PRN
Status: DISCONTINUED | OUTPATIENT
Start: 2024-07-17 | End: 2024-07-17 | Stop reason: SDUPTHER

## 2024-07-17 RX ORDER — POLYETHYLENE GLYCOL 3350 17 G/17G
17 POWDER, FOR SOLUTION ORAL 2 TIMES DAILY
Qty: 510 G | Refills: 0 | Status: SHIPPED | OUTPATIENT
Start: 2024-07-17 | End: 2024-08-01

## 2024-07-17 RX ORDER — LIDOCAINE HYDROCHLORIDE AND EPINEPHRINE 10; 10 MG/ML; UG/ML
INJECTION, SOLUTION INFILTRATION; PERINEURAL PRN
Status: DISCONTINUED | OUTPATIENT
Start: 2024-07-17 | End: 2024-07-17 | Stop reason: ALTCHOICE

## 2024-07-17 RX ORDER — PHENYLEPHRINE HCL IN 0.9% NACL 0.4MG/10ML
SYRINGE (ML) INTRAVENOUS PRN
Status: DISCONTINUED | OUTPATIENT
Start: 2024-07-17 | End: 2024-07-17 | Stop reason: SDUPTHER

## 2024-07-17 RX ADMIN — PROPOFOL 150 MCG/KG/MIN: 10 INJECTION, EMULSION INTRAVENOUS at 10:26

## 2024-07-17 RX ADMIN — Medication 80 MCG: at 11:07

## 2024-07-17 RX ADMIN — FENTANYL CITRATE 25 MCG: 50 INJECTION, SOLUTION INTRAMUSCULAR; INTRAVENOUS at 10:42

## 2024-07-17 RX ADMIN — WATER 2000 MG: 1 INJECTION INTRAMUSCULAR; INTRAVENOUS; SUBCUTANEOUS at 10:30

## 2024-07-17 RX ADMIN — ONDANSETRON 4 MG: 2 INJECTION INTRAMUSCULAR; INTRAVENOUS at 11:10

## 2024-07-17 RX ADMIN — DEXMEDETOMIDINE 6 MCG: 100 INJECTION, SOLUTION INTRAVENOUS at 10:48

## 2024-07-17 RX ADMIN — Medication 80 MCG: at 11:11

## 2024-07-17 RX ADMIN — SODIUM CHLORIDE, POTASSIUM CHLORIDE, SODIUM LACTATE AND CALCIUM CHLORIDE: 600; 310; 30; 20 INJECTION, SOLUTION INTRAVENOUS at 09:52

## 2024-07-17 RX ADMIN — Medication 40 MCG: at 11:03

## 2024-07-17 RX ADMIN — MIDAZOLAM HYDROCHLORIDE 2 MG: 1 INJECTION, SOLUTION INTRAMUSCULAR; INTRAVENOUS at 10:21

## 2024-07-17 RX ADMIN — LIDOCAINE HYDROCHLORIDE 60 MG: 20 INJECTION, SOLUTION EPIDURAL; INFILTRATION; INTRACAUDAL; PERINEURAL at 10:27

## 2024-07-17 RX ADMIN — PROPOFOL 20 MG: 10 INJECTION, EMULSION INTRAVENOUS at 10:42

## 2024-07-17 RX ADMIN — FENTANYL CITRATE 25 MCG: 50 INJECTION, SOLUTION INTRAMUSCULAR; INTRAVENOUS at 10:46

## 2024-07-17 RX ADMIN — SODIUM CHLORIDE, POTASSIUM CHLORIDE, SODIUM LACTATE AND CALCIUM CHLORIDE: 600; 310; 30; 20 INJECTION, SOLUTION INTRAVENOUS at 08:27

## 2024-07-17 RX ADMIN — FENTANYL CITRATE 25 MCG: 50 INJECTION, SOLUTION INTRAMUSCULAR; INTRAVENOUS at 10:38

## 2024-07-17 RX ADMIN — FENTANYL CITRATE 25 MCG: 50 INJECTION, SOLUTION INTRAMUSCULAR; INTRAVENOUS at 11:11

## 2024-07-17 RX ADMIN — DEXAMETHASONE SODIUM PHOSPHATE 8 MG: 10 INJECTION INTRAMUSCULAR; INTRAVENOUS at 10:40

## 2024-07-17 RX ADMIN — ACETAMINOPHEN 1000 MG: 500 TABLET ORAL at 08:27

## 2024-07-17 ASSESSMENT — PAIN DESCRIPTION - ORIENTATION: ORIENTATION: RIGHT

## 2024-07-17 ASSESSMENT — PAIN DESCRIPTION - PAIN TYPE: TYPE: SURGICAL PAIN

## 2024-07-17 ASSESSMENT — PAIN SCALES - GENERAL
PAINLEVEL_OUTOF10: 0
PAINLEVEL_OUTOF10: 3

## 2024-07-17 ASSESSMENT — PAIN DESCRIPTION - LOCATION: LOCATION: GROIN

## 2024-07-17 ASSESSMENT — PAIN DESCRIPTION - DESCRIPTORS: DESCRIPTORS: SORE;ACHING

## 2024-07-17 NOTE — DISCHARGE INSTRUCTIONS
gave you when you take a shower.     Shower with this soap until your wounds are healed.      To reach all areas of your body, you may need someone to help you.     Don’t forget to clean your belly button with every shower.     USE CLEAN SHEETS    Use freshly cleaned sheets on your bed after surgery.     To keep the surgery site clean, do not allow pets to sleep with you while your wound is still healing.     STOP SMOKING    Stop smoking, or at least cut back on smoking    Smoking slows your healing.      CONTROL YOUR BLOOD SUGAR    High blood sugars slow wound healing.    If you are diabetic, control your blood sugar levels before and after your surgery.    Please take time to review all of your Home Care Instructions and Medication Information sheets provided in your discharge packet. If you have any questions, please contact your surgeon's office. Thank you.    The discharge information has been reviewed with the patient and instruction recipient.  The patient and instruction recipient verbalized understanding.  Discharge medications reviewed with the patient and instruction recipient and appropriate educational materials and side effects teaching were provided.      Please provide this summary of care documentation to your next provider.                              How to Care for Your Wound After It's Treated With  DERMABOND* Topical Skin Adhesive    DERMABOND* Topical Skin Adhesive (2-octyl cyanoacrylate) is a sterile, liquid skin adhesive  that holds wound edges together. The film will usually remain in place for 5 to 10 days, then  naturally fall off your skin.  The following will answer some of your questions and provide instructions for proper care for your  wound while it is healing:    CHECK WOUND APPEARANCE   Some swelling, redness, and pain are common with all wounds and normally will go away as the  wound heals. If swelling, redness, or pain increases or if the wound feels warm to the touch,  contact

## 2024-07-17 NOTE — ANESTHESIA PRE PROCEDURE
Department of Anesthesiology  Preprocedure Note       Name:  Duke Munoz   Age:  44 y.o.  :  1980                                          MRN:  [id number]         Date:  2024      Surgeon: Surgeon(s):  Johnny Ramirez MD    Procedure: Procedure(s):  RIGHT INGUINAL HERNIA REPAIR WITH MESH    Medications prior to admission:   Prior to Admission medications    Medication Sig Start Date End Date Taking? Authorizing Provider   NONFORMULARY Betasol ointment as needed   Yes Aung Conley MD   NONFORMULARY Imiquimod cream- as needed   Yes Aung Conley MD   losartan (COZAAR) 50 MG tablet take 1 tablet by oral route  every day for blood pressure  Patient not taking: Reported on 7/10/2024 11/18/22   Aung Conley MD   levocetirizine (XYZAL) 5 MG tablet Take 1 tablet by mouth nightly    Aung Conley MD   Multiple Vitamin (MULTI VITAMIN PO) Take by mouth nightly    Aung Conley MD   niMODipine (NIMOTOP) 30 MG capsule Take 2 capsules by mouth every 4 hours for 14 days 23  Dale Patterson MD   amLODIPine (NORVASC) 5 MG tablet Take 1 tablet by mouth daily  Patient taking differently: Take 1 tablet by mouth nightly 8/11/23 9/10/23  Dale Patterson MD       Current medications:    No current facility-administered medications for this encounter.     Current Outpatient Medications   Medication Sig Dispense Refill   • NONFORMULARY Betasol ointment as needed     • NONFORMULARY Imiquimod cream- as needed     • losartan (COZAAR) 50 MG tablet take 1 tablet by oral route  every day for blood pressure (Patient not taking: Reported on 7/10/2024)     • levocetirizine (XYZAL) 5 MG tablet Take 1 tablet by mouth nightly     • Multiple Vitamin (MULTI VITAMIN PO) Take by mouth nightly     • niMODipine (NIMOTOP) 30 MG capsule Take 2 capsules by mouth every 4 hours for 14 days 30 capsule 3   • amLODIPine (NORVASC) 5 MG tablet Take 1 tablet by mouth daily

## 2024-07-17 NOTE — ANESTHESIA POSTPROCEDURE EVALUATION
Department of Anesthesiology  Postprocedure Note    Patient: Duke Munoz  MRN: [id number]  YOB: 1980  Date of evaluation: 7/17/2024    Procedure Summary       Date: 07/17/24 Room / Location: [facility] MAIN OR  / [facility] MAIN OR    Anesthesia Start: 1022 Anesthesia Stop: 1131    Procedure: RIGHT INGUINAL HERNIA REPAIR WITH MESH (Right: Groin) Diagnosis:       Right inguinal hernia      (Right inguinal hernia [K40.90])    Providers: Johnny Ramirez MD Responsible Provider: Emiliano Euceda MD    Anesthesia Type: MAC ASA Status: 3            Anesthesia Type: MAC    Jazmyne Phase I:      Jazmyne Phase II:      Anesthesia Post Evaluation    Patient location during evaluation: PACU  Patient participation: complete - patient participated  Level of consciousness: sleepy but conscious and responsive to verbal stimuli  Pain score: 2  Airway patency: patent  Nausea & Vomiting: no vomiting and no nausea  Cardiovascular status: blood pressure returned to baseline and hemodynamically stable  Respiratory status: acceptable  Hydration status: stable  Multimodal analgesia pain management approach  Pain management: adequate    No notable events documented.

## 2024-07-17 NOTE — INTERVAL H&P NOTE
Update History & Physical    The patient's History and Physical of July 1, 2024 was reviewed with the patient and I examined the patient. There was no change. The surgical site was confirmed by the patient and me.     Plan: The risks, benefits, expected outcome, and alternative to the recommended procedure have been discussed with the patient. Patient understands and wants to proceed with the procedure.     Electronically signed by Johnny Ramirez MD on 7/17/2024 at 9:45 AM

## 2024-07-18 NOTE — OP NOTE
[id number]   
fascia down to the aponeurosis of the external oblique.  The aponeurosis of the external oblique was opened along the length of the fibers, opening up the external ring.  More local was injected directly in the nerves and cord structures.  We mobilized up the cord.  There was a moderate-sized direct defect as well as a very small indirect defect.  Indirect defect was dissected away from the cord structures and resected away.  No evidence for a sliding component.  We then made sure we did not damage the ilioinguinal and iliohypogastric nerves.  We placed 0.5% plain Marcaine around the nerves directly and around the cord structures and then the subcutaneous tissues.  Next, we brought up a piece of Bard soft mesh on the field, created slit down the short end to create a new internal ring.  Interrupted 0 Prolene sutures were used to sew the mesh down to the inferomedial fascia of the pubic tubercle, and then down to Lele's ligament and up to the shelving edge of the inguinal ligament more superolaterally.  Medially, it was sewn to the conjoined tendon taking special care not to entrap the nerves and the sutures.  The tails of the mesh were then wrapped around the cord structures and sewn back to itself creating a snug, but not tight internal ring.  Then, the remaining tails were placed underneath the aponeurosis of the external oblique.  Next, a running 3-0 Vicryl was used to approximate the aponeurosis of the external oblique creating a new external ring.  Another running layer of 3-0 Vicryl was used to approximate the Jeancarlos's fascia and a running layer of 4-0 Vicryl was used to close the skin.  Dermabond dressing was then applied.  Upon completion of the operation, the needle, sponge, and instrument counts were correct x2.  The patient tolerated the procedure well.  He was brought to the recovery room.    DRAINS:  None.        MD JOSE GONSALES/JESI  D:  07/17/2024 11:23:19  T:  07/18/2024

## 2024-08-05 ENCOUNTER — OFFICE VISIT (OUTPATIENT)
Age: 44
End: 2024-08-05

## 2024-08-05 VITALS
WEIGHT: 167.8 LBS | TEMPERATURE: 98.3 F | BODY MASS INDEX: 26.97 KG/M2 | HEIGHT: 66 IN | HEART RATE: 79 BPM | RESPIRATION RATE: 12 BRPM | DIASTOLIC BLOOD PRESSURE: 76 MMHG | OXYGEN SATURATION: 97 % | SYSTOLIC BLOOD PRESSURE: 115 MMHG

## 2024-08-05 DIAGNOSIS — Z48.89 ENCOUNTER FOR POSTOPERATIVE CARE: Primary | ICD-10-CM

## 2024-08-05 PROCEDURE — 99024 POSTOP FOLLOW-UP VISIT: CPT | Performed by: SURGERY

## 2024-08-05 ASSESSMENT — PATIENT HEALTH QUESTIONNAIRE - PHQ9
SUM OF ALL RESPONSES TO PHQ QUESTIONS 1-9: 0
SUM OF ALL RESPONSES TO PHQ9 QUESTIONS 1 & 2: 0
SUM OF ALL RESPONSES TO PHQ QUESTIONS 1-9: 0
SUM OF ALL RESPONSES TO PHQ QUESTIONS 1-9: 0
1. LITTLE INTEREST OR PLEASURE IN DOING THINGS: NOT AT ALL
2. FEELING DOWN, DEPRESSED OR HOPELESS: NOT AT ALL
SUM OF ALL RESPONSES TO PHQ QUESTIONS 1-9: 0

## 2024-08-05 NOTE — PROGRESS NOTES
Identified pt with two pt identifiers (name and ). Reviewed chart in preparation for visit and have obtained necessary documentation.    Duke Munoz is a 44 y.o. male Post-Op Check (S/p Right inguinal hernia repair with mesh on 24)  .    Vitals:    24 0754   BP: 115/76   Site: Left Upper Arm   Position: Sitting   Pulse: 79   Resp: 12   Temp: 98.3 °F (36.8 °C)   TempSrc: Oral   SpO2: 97%   Weight: 76.1 kg (167 lb 12.8 oz)   Height: 1.676 m (5' 5.98\")          1. Have you been to the ER, urgent care clinic since your last visit?  Hospitalized since your last visit?  no     2. Have you seen or consulted any other health care providers outside of the [facility] since your last visit?  Include any pap smears or colon screening.  no

## 2024-08-05 NOTE — PROGRESS NOTES
Surgery  Follow up    Procedure: Right inguinal hernia repair with mesh   OR date:  7/17/2024  Path:    sac    S I feel fine, no pain but sore    /76 (Site: Left Upper Arm, Position: Sitting)   Pulse 79   Temp 98.3 °F (36.8 °C) (Oral)   Resp 12   Ht 1.676 m (5' 5.98\")   Wt 76.1 kg (167 lb 12.8 oz)   SpO2 97%   BMI 27.10 kg/m²     O Incisions healing well without infection   No signs of hernia    A/P Doing well   No lifting for another 2 weeks   RTW 8/6 light duty   RTC 6 weeks or prn    Johnny Ramirez MD FACS

## (undated) DEVICE — GLOVE ORANGE PI 7 1/2   MSG9075

## (undated) DEVICE — SUTURE VICRYL + SZ 4-0 L27IN ABSRB UD PS-2 3/8 CIR REV CUT VCP426H

## (undated) DEVICE — FILTER CLP DISP FOR 5513E CLIPVAC

## (undated) DEVICE — SUTURE VICRYL + SZ 3-0 L27IN ABSRB UD L26MM SH 1/2 CIR VCP416H

## (undated) DEVICE — BLADE ES L2.75IN ELASTOMERIC COAT DURABLE BEND UPTO 90DEG

## (undated) DEVICE — SUTURE PROL SZ 0 L30IN NONABSORBABLE BLU L36MM CT-1 1/2 CIR 8424H

## (undated) DEVICE — SPONGE,PEANUT,XRAY,ST,SM,3/8",5/CARD: Brand: MEDLINE INDUSTRIES, INC.

## (undated) DEVICE — LIQUIBAND RAPID ADHESIVE 36/CS 0.8ML: Brand: MEDLINE

## (undated) DEVICE — BLADE,CARBON-STEEL,15,STRL,DISPOSABLE,TB: Brand: MEDLINE

## (undated) DEVICE — BLADE CLIPPER GEN PURP NS

## (undated) DEVICE — HYPODERMIC SAFETY NEEDLE: Brand: MONOJECT

## (undated) DEVICE — SUTURE PERMAHAND SZ 0 L30IN NONABSORBABLE BLK L26MM SH 1/2 K834H

## (undated) DEVICE — 1/4 IN. X 18 IN. LENGTH: Brand: SILICONE TUBING, PENROSE DRAIN

## (undated) DEVICE — GOWN,SIRUS,NONRNF,SETINSLV,2XL,18/CS: Brand: MEDLINE

## (undated) DEVICE — MAJOR LAPAROTOMY-MRMC: Brand: MEDLINE INDUSTRIES, INC.

## (undated) DEVICE — PENCIL ES L3M ROCK SWCH S STL HEX LOK BLDE ELECTRD HOLSTER

## (undated) DEVICE — SUTURE VICRYL + SZ 3 0 L54IN ABSRB VLT LIGAPAK REEL NDL VCP205G